# Patient Record
Sex: MALE | Race: WHITE
[De-identification: names, ages, dates, MRNs, and addresses within clinical notes are randomized per-mention and may not be internally consistent; named-entity substitution may affect disease eponyms.]

---

## 2019-07-03 ENCOUNTER — HOSPITAL ENCOUNTER (OUTPATIENT)
Dept: HOSPITAL 88 - ER | Age: 64
Setting detail: OBSERVATION
LOS: 2 days | Discharge: LEFT BEFORE DISCHARGE | End: 2019-07-05
Attending: INTERNAL MEDICINE | Admitting: INTERNAL MEDICINE
Payer: COMMERCIAL

## 2019-07-03 VITALS — WEIGHT: 179.56 LBS | BODY MASS INDEX: 24.32 KG/M2 | HEIGHT: 72 IN

## 2019-07-03 DIAGNOSIS — Z80.0: ICD-10-CM

## 2019-07-03 DIAGNOSIS — Z72.89: ICD-10-CM

## 2019-07-03 DIAGNOSIS — R79.89: ICD-10-CM

## 2019-07-03 DIAGNOSIS — D12.0: ICD-10-CM

## 2019-07-03 DIAGNOSIS — G89.4: ICD-10-CM

## 2019-07-03 DIAGNOSIS — J44.9: ICD-10-CM

## 2019-07-03 DIAGNOSIS — M19.90: ICD-10-CM

## 2019-07-03 DIAGNOSIS — I10: ICD-10-CM

## 2019-07-03 DIAGNOSIS — G89.18: Primary | ICD-10-CM

## 2019-07-03 DIAGNOSIS — Z86.010: ICD-10-CM

## 2019-07-03 LAB
ALBUMIN SERPL-MCNC: 4.4 G/DL (ref 3.5–5)
ALBUMIN/GLOB SERPL: 1.5 {RATIO} (ref 0.8–2)
ALP SERPL-CCNC: 112 IU/L (ref 40–150)
ALT SERPL-CCNC: 68 IU/L (ref 0–55)
ANION GAP SERPL CALC-SCNC: 17.4 MMOL/L (ref 8–16)
BACTERIA URNS QL MICRO: (no result) /HPF
BASOPHILS # BLD AUTO: 0 10*3/UL (ref 0–0.1)
BASOPHILS NFR BLD AUTO: 0.2 % (ref 0–1)
BILIRUB UR QL: (no result)
BUN SERPL-MCNC: 9 MG/DL (ref 7–26)
BUN/CREAT SERPL: 8 (ref 6–25)
CALCIUM SERPL-MCNC: 9.5 MG/DL (ref 8.4–10.2)
CHLORIDE SERPL-SCNC: 95 MMOL/L (ref 98–107)
CLARITY UR: CLEAR
CO2 SERPL-SCNC: 22 MMOL/L (ref 22–29)
COARSE GRAN CASTS URNS QL MICRO: (no result)
COLOR UR: YELLOW
DEPRECATED APTT PLAS QN: 27.5 SECONDS (ref 23.8–35.5)
DEPRECATED INR PLAS: 0.95
DEPRECATED NEUTROPHILS # BLD AUTO: 13.2 10*3/UL (ref 2.1–6.9)
DEPRECATED RBC URNS MANUAL-ACNC: (no result) /HPF (ref 0–5)
EGFRCR SERPLBLD CKD-EPI 2021: > 60 ML/MIN (ref 60–?)
EOSINOPHIL # BLD AUTO: 0 10*3/UL (ref 0–0.4)
EOSINOPHIL NFR BLD AUTO: 0.1 % (ref 0–6)
EPI CELLS URNS QL MICRO: (no result) /LPF
ERYTHROCYTE [DISTWIDTH] IN CORD BLOOD: 12.3 % (ref 11.7–14.4)
GLOBULIN PLAS-MCNC: 2.9 G/DL (ref 2.3–3.5)
GLUCOSE SERPLBLD-MCNC: 123 MG/DL (ref 74–118)
HCT VFR BLD AUTO: 40.6 % (ref 38.2–49.6)
HGB BLD-MCNC: 14.2 G/DL (ref 14–18)
KETONES UR QL STRIP.AUTO: (no result)
LEUKOCYTE ESTERASE UR QL STRIP.AUTO: NEGATIVE
LYMPHOCYTES # BLD: 0.6 10*3/UL (ref 1–3.2)
LYMPHOCYTES NFR BLD AUTO: 4.1 % (ref 18–39.1)
MCH RBC QN AUTO: 32.2 PG (ref 28–32)
MCHC RBC AUTO-ENTMCNC: 35 G/DL (ref 31–35)
MCV RBC AUTO: 92.1 FL (ref 81–99)
MONOCYTES # BLD AUTO: 1.3 10*3/UL (ref 0.2–0.8)
MONOCYTES NFR BLD AUTO: 8.7 % (ref 4.4–11.3)
MUCOUS THREADS URNS QL MICRO: (no result)
NEUTS SEG NFR BLD AUTO: 86.6 % (ref 38.7–80)
NITRITE UR QL STRIP.AUTO: NEGATIVE
PLATELET # BLD AUTO: 254 X10E3/UL (ref 140–360)
POTASSIUM SERPL-SCNC: 3.4 MMOL/L (ref 3.5–5.1)
PROT UR QL STRIP.AUTO: NEGATIVE
PROTHROMBIN TIME: 13.2 SECONDS (ref 11.9–14.5)
RBC # BLD AUTO: 4.41 X10E6/UL (ref 4.3–5.7)
SODIUM SERPL-SCNC: 131 MMOL/L (ref 136–145)
SP GR UR STRIP: >=1.03 (ref 1.01–1.02)
UROBILINOGEN UR STRIP-MCNC: 0.2 MG/DL (ref 0.2–1)
WBC #/AREA URNS HPF: (no result) /HPF (ref 0–5)

## 2019-07-03 PROCEDURE — 85730 THROMBOPLASTIN TIME PARTIAL: CPT

## 2019-07-03 PROCEDURE — 85610 PROTHROMBIN TIME: CPT

## 2019-07-03 PROCEDURE — 83690 ASSAY OF LIPASE: CPT

## 2019-07-03 PROCEDURE — 80053 COMPREHEN METABOLIC PANEL: CPT

## 2019-07-03 PROCEDURE — 36415 COLL VENOUS BLD VENIPUNCTURE: CPT

## 2019-07-03 PROCEDURE — 85025 COMPLETE CBC W/AUTO DIFF WBC: CPT

## 2019-07-03 PROCEDURE — 74177 CT ABD & PELVIS W/CONTRAST: CPT

## 2019-07-03 PROCEDURE — 74019 RADEX ABDOMEN 2 VIEWS: CPT

## 2019-07-03 PROCEDURE — 82150 ASSAY OF AMYLASE: CPT

## 2019-07-03 PROCEDURE — 99284 EMERGENCY DEPT VISIT MOD MDM: CPT

## 2019-07-03 PROCEDURE — 81001 URINALYSIS AUTO W/SCOPE: CPT

## 2019-07-03 NOTE — XMS REPORT
Clinical Summary

                             Created on: 2019



Renaldo Serna

External Reference #: [id number]

: 1955

Sex: Male



Demographics







                          Address                   [address]

[place]  [address]

 

                          Home Phone                [phone]

 

                          Preferred Language        Unknown

 

                          Marital Status            Single

 

                          [employer] Affiliation     [employer]

 

                          Race                      Unknown

 

                          Ethnic Group              Unknown





Author







                          Author                    [facility]

 

                          Organization              [facility]

 

                          Address                   Unknown

 

                          Phone                     Unavailable







Support







                Name            Relationship    Address         Phone

 

                    Lottie GARCIA                [address]

[place]  [address]                     [phone]







Care Team Providers







                    Care Team Member Name    Role                Phone

 

                    David Mascorro MD    PCP                 [phone]







Allergies

No Known Allergies



Medications







                          End Date                  Status



              Medication     Sig          Dispensed     Refills      Start  



                                         Date  

 

                                                    Active



                     multivitamin tablet     Take 1 tablet       0   



                                         by mouth     



                                         daily.     

 

                                                    Active



              gabapentin (NEURONTIN)     Take 1       90 capsule     1            10/10/201  



                     300 mg              capsule by          4  



                           capsuleIndications: Back     mouth 3 times     



                           pain                      daily.     

 

                                                    Active



              Nebulizer & Compressor     by           1 Device     0            10/10/201  



                     For Neb DeviIndications:     Misc.(Non-Pierre       4  



                           Reactive airway disease     g; Combo     



                                         Route) route.     

 

                                                    Active



              hypromellose (GONAK) 2.5     Instill 1     15 mL        3              



                     % ophthalmic        Drop in each        5  



                           solutionIndications: Dry     eye 3 times     



                           eye                       daily.     

 

                                                    Active



              traMADol (ULTRAM) 50 mg     Take 1 tablet     60 tablet     1              



                     tabletIndications: Back     by mouth            5  



                           pain                      every 12     



                                         hours as     



                                         needed for     



                                         Pain.     

 

                                                    Active



              Arm Brace (NEOPRENE WRIST     use as       1 Each       1              



                     SPLINT SUPPORT)     directed            5  



                           MiscIndications: Abnormal     NONF     



                                         MRI      

 

                                                    Active



              zafirlukast (ACCOLATE) 20     Take 1 tablet     180 tablet     5              



                     mg tabletIndications:     by mouth 2          5  



                           Asthmatic bronchitis,     times daily.     



                                         severe persistent,      



                                         uncomplicated,      



                                         Uncomplicated severe      



                                         persistent asthma      

 

                                                    Active



              celecoxib (CELEBREX) 200     Take 1       60 capsule     4              



                     mg capsuleIndications:     capsule by          6  



                           Arthralgia, unspecified     mouth 2 times     



                           joint                     daily.     

 

                                                    Active



              Nebulizer & Compressor     1 Device by     1 Device     0              



                     For Neb DeviIndications:     Misc.(Non-Pierre       6  



                           Asthmatic bronchitis,     g; Combo     



                           severe persistent, with     Route) route     



                           acute exacerbation        4 times     



                                         daily.     

 

                                                    Active



              blood pressure monitor     1 Device by     1 Kit        0              



                     (BLOOD PRESSURE KIT)     Misc.(Non-Pierre       6  



                           KitIndications: Essential     g; Combo     



                           hypertension              Route) route     



                                         daily.     

 

                                                    Active



              acetaminophen-codeine     Take 2       60 tablet     1              



                     (TYLENOL/CODEINE #3)     tablets by          6  



                           300-30 mg per             mouth every 4     



                           tabletIndications:        hours as     



                           Chronic left-sided low     needed for     



                           back pain with sciatica,     Pain.     



                                         sciatica laterality      



                                         unspecified      

 

                                                    Active



              ipratropium (ATROVENT)     Inhale 2.5 mL     250 mL       3              



                     0.02 % nebulizer     by mouth 4          7  



                           solutionIndications:      times daily.     



                                         Uncomplicated severe      



                                         persistent asthma,      



                                         Chronic obstructive      



                                         pulmonary disease with      



                                         acute exacerbation      

 

                                                    Active



              albuterol (PROVENTIL) 2.5     Inhale 3 mL     300 mL       3              



                     mg /3 mL (0.083 %)     by mouth            7  



                           nebulizer                 every 6 hours     



                           solutionIndications:      as needed for     



                           Chronic obstructive       Wheezing.     



                                         pulmonary disease with      



                                         acute exacerbation      

 

                                                    Active



              acetaminophen-codeine     Take 1 tablet     90 tablet     2              



                     (TYLENOL/CODEINE #3)     by mouth 2          7  



                           300-30 mg per             times daily.     



                                         tabletIndications: Acute      



                                         bilateral low back pain      



                                         with right-sided sciatica      

 

                                                    Active



              azelastine (OPTIVAR) 0.05     Instill 1     6 mL         3              



                     % ophthalmic        Drop in each        7  



                           solutionIndications: Dry     eye 2 times     



                           eye                       daily.     

 

                                                    Active



              albuterol (PROVENTIL) 2.5     Inhale 3 mL     300 mL       3              



                     mg /3 mL (0.083 %)     by mouth            7  



                           nebulizer                 every 4 hours     



                           solutionIndications:      as needed for     



                           Chronic obstructive       Wheezing or     



                           pulmonary disease with     Shortness of     



                           acute exacerbation        Breath.     

 

                                                    Active



              albuterol (PROVENTIL) 2.5     USE ONE VIAL     300 mL       3              



                     mg /3 mL (0.083 %)     VIA                 8  



                           nebulizer                 NEBULIZATION     



                           solutionIndications:      BY MOUTH     



                           Chronic obstructive       EVERY 6 HOURS     



                           pulmonary disease with     AS NEEDED FOR     



                           acute exacerbation        WHEEZING.     

 

                                                    Active



              albuterol (PROVENTIL) 2.5     USE ONE VIAL     300 mL       3            /201  



                     mg /3 mL (0.083 %)     (3ML) VIA           8  



                           nebulizer                 NEBULIZATION     



                           solutionIndications:      BY MOUTH     



                           Chronic obstructive       EVERY 6 HOURS     



                           pulmonary disease with     AS NEEDED FOR     



                           acute exacerbation        WHEEZING     

 

                                                    Active



              ipratropium (ATROVENT)     INHALE ONE     250 mL       3              



                     0.02 % nebulizer     VIAL VIA            8  



                           solutionIndications:      NEBULIZER BY     



                           Chronic obstructive       MOUTH FOUR     



                           pulmonary disease with     TIMES A DAY     



                                         acute exacerbation      

 

                                                    Active



              nystatin (MYCOSTATIN)     APPLY TO     60 g         2              



                     topical creamIndications:     AFFECTED            8  



                           Tinea                     AREA(S) THREE     



                                         TIMES A DAY     

 

                                                    Active



              hydrOXYzine (ATARAX) 25     TAKE ONE     180 tablet     2              



                     mg tabletIndications:     TABLET BY           8  



                           Rash                      MOUTH TWICE A     



                                         DAY OR THREE     



                                         TIMES A DAY.     

 

                                                    Active



              tiZANidine (ZANAFLEX) 4     TAKE ONE     180 tablet     2              



                     mg tabletIndications:     TABLET BY           8  



                           Spasm of muscle           MOUTH TWICE A     



                                         DAY AS NEEDED     



                                         FOR MUSCLE     



                                         SPASMS.     

 

                                                    Active



              budesonide-formoterol     Inhale 2     10.2 g       3              



                     (SYMBICORT HFA) 160-4.5     Puffs by            8  



                           mcg/actuation             mouth 2 times     



                           inhalerIndications:       daily.     



                                         Chronic obstructive      



                                         pulmonary disease with      



                                         acute exacerbation      

 

                                                    Active



                 fluticasone-vilanterol      60 Each         1                 



                           (BREO ELLIPTA) 200-25        8  



                                         mcg/dose DsDvIndications:      



                                         Chronic obstructive      



                                         pulmonary disease with      



                                         acute exacerbation      

 

                                                    Active



              mometasone (NASONEX) 50     SPRAY TWO     17 g         3              



                     mcg/actuation nasal     SPRAYS IN           8  



                           sprayIndications: Chronic     EACH NOSTRIL     



                           rhinitis                  ONCE DAILY.     

 

                                                    Active



              Omeprazole 40 mg     Take 1       90 capsule     2              



                     capsuleIndications:     capsule by          8  



                           Gastroesophageal reflux     mouth daily.     



                                         disease with esophagitis      

 

                                                    Active



              tamsulosin (FLOMAX) 0.4     Take 1       30 capsule     2              



                     mg extended release     capsule by          8  



                           capsuleIndications:       mouth daily.     



                                         Benign nodular prostatic      



                                         hyperplasia without lower      



                                         urinary tract symptoms      

 

                                                    Active



              calcipotriene (DOVONEX)     APPLY TO     60 g         2              



                     0.005 % topical     AFFECTED            8  



                           creamIndications: Rash     AREA(S) TWO     



                                         TIMES A DAY.     

 

                                                    Active



              hydroCHLOROthiazide 12.5     TAKE ONE     90 capsule     1              



                     mg capsuleIndications:     CAPSULE BY          8  



                           Essential hypertension     MOUTH EVERY     



                                         MORNING.     

 

                                                    Active



              alendronate (FOSAMAX) 70     TAKE ONE     12 tablet     1              



                     mg tabletIndications:     TABLET BY           8  



                           Senile osteoporosis       MOUTH ONCE     



                                         WEEKLY ON AN     



                                         EMPTY STOMACH     



                                         WITH 8 OUNCES     



                                         OF WATER AND     



                                         REMAIN     



                                         UPRIGHT FOR     



                                         30 MINUTES..     

 

                                                    Active



              sildenafil citrate     Take 1 tablet     30 tablet     1              



                     (VIAGRA) 25 mg      by mouth as         8  



                           tabletIndications: Other     needed for     



                           male erectile dysfunction     Erectile     



                                         Dysfunction.     

 

                          2019                Active



              albuterol 90     INHALE TWO     3 Month      3              



                 mcg/actuation     PUFFS BY        Supply          8  



                           inhalerIndications:       MOUTH EVERY 4     



                           Chronic obstructive       HOURS AS     



                           pulmonary disease with     NEEDED FOR     



                           acute exacerbation        WHEEZING OR     



                                         FOR SHORTNESS     



                                         OF BREATH.     

 

                                                    Active



              amLODIPine (NORVASC) 5 mg     Take 1 tablet     90 tablet     2              



                     tabletIndications:     by mouth            9  



                           Essential hypertension     daily.     

 

                                                    Active



              fluticasone-umeclidin-marlene     Inhale 1 Puff     1 Inhaler     5              



                     anter (TRELEGY ELLIPTA)     by mouth            9  



                           100-62.5-25 mcg           daily.     



                                         DsDvIndications: Chronic      



                                         obstructive pulmonary      



                                         disease with acute      



                                         exacerbation      

 

                                                    Active



              hydroCHLOROthiazide 12.5     TAKE ONE     30 capsule     4              



                     mg capsuleIndications:     CAPSULE BY          9  



                           Essential hypertension     MOUTH EVERY     



                                         MORNING     

 

                                                    Active



              tamsulosin (FLOMAX) 0.4     TAKE ONE     30 capsule     0              



                     mg extended release     CAPSULE BY          9  



                           capsuleIndications:       MOUTH DAILY     



                                         Benign nodular prostatic      



                                         hyperplasia without lower      



                                         urinary tract symptoms      

 

                                                    Active



              NASONEX 50 mcg/actuation     SPRAY TWO     16 g         2              



                     nasal sprayIndications:     SPRAYS IN           9  



                           Chronic rhinitis          EACH NOSTRIL     



                                         ONCE DAILY     

 

                          2018                Discontinued



              ketoconazole (NIZORAL) 2     Apply to     30 g         1              



                     % topical           affected area       5  



                           creamIndications: Tinea     2 times     



                           pedis                     daily.     

 

                          2018                Discontinued



              dexlansoprazole     Take 1       90 capsule     0            01/10/201  



                     (DEXILANT) 60 mg delayed     capsule by          6  



                           release                   mouth daily.     



                           capsuleIndications:       Auto sub for     



                           Gastroesophageal reflux     omeprazole     



                                         disease without      



                                         esophagitis      

 

                          2018                Discontinued



              fluticasone-salmeterol     Inhale 1 Puff     3 Month      3              



                 (ADVAIR DISKUS) 500-50     by mouth 2      Supply          6  



                           mcg/dose diskus           times daily.     



                                         inhalerIndications:      



                                         Chronic obstructive      



                                         pulmonary disease with      



                                         acute exacerbation      

 

                          2018                Discontinued



              hydrOXYzine (ATARAX) 25     Take 1 tablet     90 tablet     2              



                     mg tabletIndications:     po bid or           6  



                           Major depressive          tid.     



                                         disorder, single episode,      



                                         unspecified      

 

                          2018                Discontinued



              nystatin (MYCOSTATIN)     Apply to     60 g         3              



                     topical creamIndications:     affected area       7  



                           Tinea                     3 times     



                                         daily.     

 

                          2018                Discontinued



              hydrOXYzine (ATARAX) 25     TAKE ONE     270 tablet     1              



                     mg tabletIndications:     TABLET BY           7  



                           Chronic obstructive       MOUTH TWICE A     



                           pulmonary disease with     DAY TO THREE     



                           acute exacerbation        TIMES A DAY.     

 

                          2018                Discontinued



              sildenafil citrate     Take 1 tablet     10 tablet     3              



                     (VIAGRA) 100 mg     by mouth as         7  



                           tabletIndications:        needed for     



                           Erectile dysfunction due     Erectile     



                           to arterial insufficiency     Dysfunction.     

 

                          2018                Discontinued



              amLODIPine (NORVASC) 10     Take 1 tablet     90 tablet     3              



                     mg tabletIndications:     by mouth            7  



                           Essential hypertension     daily.     

 

                          2018                Discontinued



              amLODIPine (NORVASC) 10     Take 1 tablet     90 tablet     0              



                     mg tabletIndications:     by mouth            7  



                           Essential hypertension     daily.     

 

                          2018                Discontinued



              tiZANidine (ZANAFLEX) 4     TAKE ONE     180 tablet     0              



                     mg tabletIndications:     TABLET BY           7  



                           Spasm of muscle           MOUTH TWICE A     



                                         DAY AS NEEDED     



                                         FOR MUSCLE     



                                         SPASMS.     

 

                          2018                Discontinued



              Omeprazole 40 mg     Take 1       90 capsule     3              



                     capsuleIndications:     capsule by          7  



                           Gastroesophageal reflux     mouth daily.     



                                         disease with esophagitis      

 

                          2018                Discontinued



              sildenafil citrate     Take 1 tablet     20 tablet     5              



                     (VIAGRA) 100 mg     by mouth as         7  



                           tabletIndications:        needed for     



                           Erectile dysfunction due     Erectile     



                           to arterial insufficiency     Dysfunction.     

 

                          2018                Discontinued



              mometasone (NASONEX) 50     2 Sprays by     51 g         4              



                     mcg/actuation nasal     each nostril        7  



                           sprayIndications: Chronic     route daily.     



                                         seasonal allergic      



                                         rhinitis due to pollen      

 

                          2018                Discontinued



              fluticasone-vilanterol     Take 1 Puff     180 Each     2              



                     (BREO ELLIPTA) 200-25     by mouth            7  



                           mcg/dose DsDvIndications:     daily.     



                                         Chronic obstructive      



                                         pulmonary disease with      



                                         acute exacerbation      

 

                          2018                Discontinued



              albuterol (PROAIR HFA) 90     INHALE TWO     3 Month      3              



                 mcg/actuation     PUFFS BY        Supply          7  



                           inhalerIndications:       MOUTH EVERY 4     



                           Chronic obstructive       HOURS AS     



                           pulmonary disease with     NEEDED FOR     



                           acute exacerbation        WHEEZING OR     



                                         FOR SHORTNESS     



                                         OF BREATH.     

 

                          2018                Discontinued



              budesonide-formoterol     Inhale 2     10.2 g       3              



                     (SYMBICORT HFA) 160-4.5     Puffs by            7  



                           mcg/actuation             mouth 2 times     



                           inhalerIndications:       daily.     



                                         Moderate persistent      



                                         asthma with status      



                                         asthmaticus      

 

                          2018                Discontinued



              alendronate (FOSAMAX) 70     TAKE ONE     4 tablet     2              



                     mg tabletIndications:     TABLET BY           7  



                           Senile osteoporosis       MOUTH ONCE     



                                         WEEKLY ON AN     



                                         EMPTY STOMACH     



                                         WITH 8 OUNCES     



                                         OF WATER AND     



                                         REMAIN     



                                         UPRIGHT FOR     



                                         30 MINUTES.     

 

                          2018                Discontinued



              promethazine (PHENERGAN)     Take 1 tablet     60 tablet     3              



                     25 mg tabletIndications:     by mouth            7  



                           Nausea                    every 8 hours     



                                         as needed for     



                                         Nausea or     



                                         Vomiting.     

 

                          2018                Discontinued



              BREO ELLIPTA 200-25     INHALE ONE     60 Each      1              



                     mcg/dose DsDvIndications:     DOSE BY MOUTH       8  



                           Chronic obstructive       DAILY     



                                         pulmonary disease with      



                                         acute exacerbation      

 

                          2018                Discontinued



              calcipotriene (DOVONEX)     Apply to     60 g         4              



                     0.005 % topical     affected area       8  



                           creamIndications: Rash     2 times     



                                         daily.     

 

                          2018                Discontinued



              sildenafil citrate     Take 1 tablet     30 tablet     4              



                     (VIAGRA) 100 mg     by mouth as         8  



                           tabletIndications:        needed for     



                           Erectile dysfunction due     Erectile     



                           to arterial insufficiency     Dysfunction.     

 

                          2018                Discontinued



              omeprazole (PRILOSEC) 20     TAKE ONE     60 capsule     2              



                     mg delayed release     CAPSULE BY          8  



                           capsuleIndications:       MOUTH TWICE A     



                           Gastroesophageal reflux     DAY     



                                         disease with esophagitis      

 

                          11/10/2018                Discontinued



              ipratropium (ATROVENT)     Inhale 2.5 mL     300 mL       3              



                     0.02 % nebulizer     by mouth 4          8  



                           solutionIndications:      times daily.     



                                         Chronic obstructive      



                                         pulmonary disease with      



                                         acute exacerbation      

 

                          2018                Discontinued



              promethazine (PHENERGAN)     TAKE ONE     60 tablet     0              



                     25 mg tabletIndications:     TABLET BY           8  



                           Nausea                    MOUTH EVERY 8     



                                         HOURS AS     



                                         NEEDED FOR     



                                         NAUSEA AND     



                                         VOMITING     

 

                          2018                Discontinued



              tamsulosin (FLOMAX) 0.4     TAKE ONE     30 capsule     2              



                     mg extended release     CAPSULE BY          8  



                           capsuleIndications:       MOUTH DAILY     



                                         Benign nodular prostatic      



                                         hyperplasia without lower      



                                         urinary tract symptoms      

 

                          2018                Discontinued



              NASONEX 50 mcg/actuation     SPRAY TWO     16 g         3              



                     nasal sprayIndications:     SPRAYS IN           8  



                           Chronic rhinitis          EACH NOSTRIL     



                                         ONCE DAILY     

 

                          2018                Discontinued



              albuterol (PROVENTIL) 2.5     USE ONE VIAL     300 mL       3              



                     mg /3 mL (0.083 %)     VIA                 8  



                           nebulizer                 NEBULIZATION     



                           solutionIndications:      BY MOUTH     



                           Chronic obstructive       EVERY 6 HOURS     



                           pulmonary disease with     AS NEEDED FOR     



                           acute exacerbation        WHEEZING     

 

                          2018                Discontinued



              hydrOXYzine (ATARAX) 25     TAKE ONE     90 tablet     0            05/15/201  



                     mg tabletIndications:     TABLET BY           8  



                           Itch                      MOUTH TWICE A     



                                         DAY OR THREE     



                                         TIMES A DAY     

 

                          2018                Discontinued



              hydroCHLOROthiazide 12.5     TAKE ONE     30 capsule     0              



                     mg capsuleIndications:     CAPSULE BY          8  



                           Essential hypertension     MOUTH EVERY     



                                         MORNING     

 

                          2018                Discontinued



              tiZANidine (ZANAFLEX) 4     TAKE ONE     60 tablet     3              



                     mg tabletIndications:     TABLET BY           8  



                           Spasm of muscle           MOUTH TWICE A     



                                         DAY AS NEEDED     



                                         FOR MUSCLE     



                                         SPASMS.     

 

                          2018                Discontinued



              codeine-guaiFENesin     TAKE 5ML BY     120 mL       0              



                     (CHERATUSSIN AC)      MOUTH THREE         8  



                           mg/5 mL syrupIndications:     TIMES A DAY     



                           Cough                     AS NEEDED     



                                         COUGH.     

 

                          2018                Discontinued



              hydroCHLOROthiazide 12.5     TAKE ONE     30 capsule     0              



                     mg capsuleIndications:     CAPSULE BY          8  



                           Essential hypertension     MOUTH EVERY     



                                         MORNING     

 

                          2018                Discontinued



              hydroCHLOROthiazide 12.5     TAKE ONE     30 capsule     5              



                     mg capsuleIndications:     CAPSULE BY          8  



                           Essential hypertension     MOUTH EVERY     



                                         MORNING.     

 

                          2018                Discontinued



              tamsulosin (FLOMAX) 0.4     TAKE ONE     30 capsule     1              



                     mg extended release     CAPSULE BY          8  



                           capsuleIndications:       MOUTH DAILY     



                                         Benign nodular prostatic      



                                         hyperplasia without lower      



                                         urinary tract symptoms      

 

                          2019                Discontinued



                 HYDROcodone-ibuprofen     Take 1 tablet      0                 



                     7.5-200 mg per tablet     by mouth            8  



                                         every 4 hours     



                                         as needed.     

 

                          2018                Discontinued



              tamsulosin (FLOMAX) 0.4     Take 1       90 capsule     1              



                     mg extended release     capsule by          8  



                           capsuleIndications:       mouth daily.     



                                         Benign nodular prostatic      



                                         hyperplasia without lower      



                                         urinary tract symptoms      

 

                          2018                Discontinued



              hydroCHLOROthiazide 12.5     TAKE ONE     90 capsule     1              



                     mg capsuleIndications:     CAPSULE BY          8  



                           Essential hypertension     MOUTH EVERY     



                                         MORNING.     

 

                          2018                Discontinued



              tiZANidine (ZANAFLEX) 4     TAKE ONE     180 tablet     1              



                     mg tabletIndications:     TABLET BY           8  



                           Spasm of muscle           MOUTH TWICE A     



                                         DAY AS NEEDED     



                                         FOR MUSCLE     



                                         SPASMS.     

 

                          2018                Discontinued



              hydrOXYzine (ATARAX) 25     TAKE ONE     180 tablet     1              



                     mg tabletIndications:     TABLET BY           8  



                           Itch                      MOUTH TWICE A     



                                         DAY OR THREE     



                                         TIMES A DAY.     

 

                          2018                Discontinued



              mometasone (NASONEX) 50     SPRAY TWO     16 g         3              



                     mcg/actuation nasal     SPRAYS IN           8  



                           sprayIndications: Chronic     EACH NOSTRIL     



                           rhinitis                  ONCE DAILY.     

 

                          2018                Discontinued



              omeprazole (PRILOSEC) 20     Take 1       60 capsule     2              



                     mg delayed release     capsule by          8  



                           capsuleIndications:       mouth 2 times     



                           Gastroesophageal reflux     daily.     



                                         disease with esophagitis      

 

                          2018                Discontinued



              calcipotriene (DOVONEX)     Apply to     180 g        1              



                     0.005 % topical     affected area       8  



                           creamIndications: Rash     2 times     



                                         daily.     

 

                          2018                Discontinued



                 fluticasone-vilanterol      60 Each         1                 



                           (BREO ELLIPTA) 200-25        8  



                                         mcg/dose DsDvIndications:      



                                         Chronic obstructive      



                                         pulmonary disease with      



                                         acute exacerbation      

 

                          2018                Discontinued



              alendronate (FOSAMAX) 70     TAKE ONE     12 tablet     1              



                     mg tabletIndications:     TABLET BY           8  



                           Senile osteoporosis       MOUTH ONCE     



                                         WEEKLY ON AN     



                                         EMPTY STOMACH     



                                         WITH 8 OUNCES     



                                         OF WATER AND     



                                         REMAIN     



                                         UPRIGHT FOR     



                                         30 MINUTES..     

 

                          2018                Discontinued



              budesonide-formoterol     Inhale 2     10.2 g       3              



                     (SYMBICORT HFA) 160-4.5     Puffs by            8  



                           mcg/actuation             mouth 2 times     



                           inhalerIndications:       daily.     



                                         Moderate persistent      



                                         asthma with status      



                                         asthmaticus      

 

                          2018                Discontinued



              amLODIPine (NORVASC) 10     Take 1 tablet     90 tablet     1              



                     mg tabletIndications:     by mouth            8  



                           Essential hypertension     daily.     

 

                          2018                Discontinued



              nystatin (MYCOSTATIN)     Apply to     60 g         3              



                     topical creamIndications:     affected area       8  



                           Tinea                     3 times     



                                         daily.     

 

                          2018                Discontinued



              dexlansoprazole     Take 1       90 capsule     1              



                     (DEXILANT) 60 mg delayed     capsule by          8  



                           release                   mouth daily.     



                           capsuleIndications:       Auto sub for     



                           Gastroesophageal reflux     omeprazole     



                                         disease without      



                                         esophagitis      

 

                          2018                Discontinued



              Omeprazole 40 mg     TAKE ONE     30 capsule     2              



                     capsuleIndications:     CAPSULE BY          8  



                           Gastroesophageal reflux     MOUTH DAILY     



                                         disease with esophagitis      

 

                          2018                Discontinued



              albuterol (PROVENTIL) 2.5     USE ONE VIAL     300 mL       2              



                     mg /3 mL (0.083 %)     VIA                 8  



                           nebulizer                 NEBULIZATION     



                           solutionIndications:      BY MOUTH     



                           Chronic obstructive       EVERY 6 HOURS     



                           pulmonary disease with     AS NEEDED FOR     



                           acute exacerbation        WHEEZING     

 

                          2018                Discontinued



              hydrOXYzine (ATARAX) 25     TAKE ONE     180 tablet     2              



                     mg tabletIndications:     TABLET BY           8  



                           Itch, Psychophysiological     MOUTH TWICE A     



                           insomnia                  DAY OR THREE     



                                         TIMES A DAY.     

 

                          2018                Discontinued



              tiZANidine (ZANAFLEX) 4     TAKE ONE     180 tablet     2              



                     mg tabletIndications:     TABLET BY           8  



                           Spasm of muscle           MOUTH TWICE A     



                                         DAY AS NEEDED     



                                         FOR MUSCLE     



                                         SPASMS.     

 

                          2018                Discontinued



              NASONEX 50 mcg/actuation     SPRAY TWO     16 g         2              



                     nasal sprayIndications:     SPRAYS IN           8  



                           Chronic rhinitis          EACH NOSTRIL     



                                         ONCE DAILY     

 

                          10/30/2018                Discontinued



              tamsulosin (FLOMAX) 0.4     TAKE ONE     30 capsule     0              



                     mg extended release     CAPSULE BY          8  



                           capsuleIndications:       MOUTH DAILY     



                                         Benign nodular prostatic      



                                         hyperplasia without lower      



                                         urinary tract symptoms      

 

                          2018                Discontinued



              tamsulosin (FLOMAX) 0.4     TAKE ONE     30 capsule     0            10/30/201  



                     mg extended release     CAPSULE BY          8  



                           capsuleIndications:       MOUTH DAILY     



                                         Benign nodular prostatic      



                                         hyperplasia without lower      



                                         urinary tract symptoms      

 

                          2018                Discontinued



              Omeprazole 40 mg     TAKE ONE     30 capsule     1              



                     capsuleIndications:     CAPSULE BY          8  



                           Gastroesophageal reflux     MOUTH DAILY     



                                         disease with esophagitis      

 

                          2018                Discontinued



              Omeprazole 40 mg     Take 1       90 capsule     2              



                     capsuleIndications:     capsule by          8  



                           Gastroesophageal reflux     mouth daily.     



                                         disease with esophagitis      

 

                          2018                Discontinued



              calcipotriene (DOVONEX)     APPLY TO     60 g         2              



                     0.005 % topical     AFFECTED            8  



                           creamIndications: Rash     AREA(S) TWO     



                                         TIMES A DAY     

 

                          2018                Discontinued



              Omeprazole 40 mg     Take 1       90 capsule     2              



                     capsuleIndications:     capsule by          8  



                           Gastroesophageal reflux     mouth daily.     



                                         disease with esophagitis      

 

                          2018                Discontinued



              tamsulosin (FLOMAX) 0.4     TAKE ONE     30 capsule     0              



                     mg extended release     CAPSULE BY          8  



                           capsuleIndications:       MOUTH DAILY     



                                         Benign nodular prostatic      



                                         hyperplasia without lower      



                                         urinary tract symptoms      

 

                          2018                Discontinued



              NASONEX 50 mcg/actuation     SPRAY TWO     17 g         3              



                     nasal sprayIndications:     SPRAYS IN           8  



                           Chronic rhinitis          EACH NOSTRIL     



                                         ONCE DAILY     

 

                          2019                Discontinued



              amLODIPine (NORVASC) 10     Take 1 tablet     90 tablet     1              



                     mg tabletIndications:     by mouth            8  



                           Essential hypertension     daily.     

 

                          2019                Discontinued



              tamsulosin (FLOMAX) 0.4     TAKE ONE     30 capsule     0              



                     mg extended release     CAPSULE BY          8  



                           capsuleIndications:       MOUTH DAILY     



                                         Benign nodular prostatic      



                                         hyperplasia without lower      



                                         urinary tract symptoms      

 

                          2019                Discontinued



              tamsulosin (FLOMAX) 0.4     TAKE ONE     30 capsule     0            /201  



                     mg extended release     CAPSULE BY          9  



                           capsuleIndications:       MOUTH DAILY     



                                         Benign nodular prostatic      



                                         hyperplasia without lower      



                                         urinary tract symptoms      

 

                          2019                Discontinued



              tamsulosin (FLOMAX) 0.4     TAKE ONE     30 capsule     0            /201  



                     mg extended release     CAPSULE BY          9  



                           capsuleIndications:       MOUTH DAILY     



                                         Benign nodular prostatic      



                                         hyperplasia without lower      



                                         urinary tract symptoms      







Active Problems







 



                           Problem                   Noted Date

 

 



                           Varicose veins            2016

 

 



                           Carpal tunnel syndrome, bilateral     2016

 

 



                           Elevated PSA              10/23/2015

 

 



                           Benign prostatic hyperplasia     2015

 

 



                           NS (nuclear sclerosis)     2015

 

 



                           Hyperopia with astigmatism and presbyopia     2015

 

 



                           History of shingles       2015

 

 



                           Dry eye                   2015

 

 



                           Low back pain             2014

 

 



                           Edentulous                2014







Encounters







                          Care Team                 Description



                     Date                Type                Specialty  

 

                                        



David Mascorro MD               Chronic rhinitis



                     2019          Refill              Family Practice  

 

                                        



Sasha Davis LMSW                 Pre-clinic Chart Review;  (DME repairs)



                     2019          Telephone           Social Work  

 

                                        



Jeff Thomas RN           



                           2019                Nurse Triage   

 

                                        



David Mascorro MD               Benign nodular prostatic hyperplasia without lower urinary

 tract symptoms



                     2019          Refill              Family Practice  

 

                                        



David Mascorro MD               Benign nodular prostatic hyperplasia without lower urinary

 tract symptoms



                     2019          Refill              Family Practice  

 

                                        



David Mascorro MD               Essential hypertension



                     2019          Refill              [facility]  

 

                                        



David Mascorro MD               Benign nodular prostatic hyperplasia without lower urinary

 tract symptoms



                     2019          Refill              Family Practice  

 

                                        



David Mascorro MD               Elevated liver enzymes (Primary Dx); 

Essential hypertension; 

Chronic obstructive pulmonary disease with acute exacerbation



                     2019          Office Visit        [facility]  

 

                                                     



                           2019                Travel   

 

                                        



David Mascorro MD               Benign nodular prostatic hyperplasia without lower urinary

 tract symptoms



                     2018          Refill              Family Practice  

 

                                        



[employer]                 Durable Medical Equipment (Inquire about title XIX received

 requesting power wheelchair repair.); Pre-clinic Chart Review



                     2018          Telephone           Social Work  

 

                                        



Wilfredo Rivero MD                      Essential hypertension (Primary Dx); 

Chronic obstructive pulmonary disease with acute exacerbation; 

Chronic rhinitis; 

Rash; 

Benign nodular prostatic hyperplasia without lower urinary tract symptoms; 

Gastroesophageal reflux disease with esophagitis; 

Spasm of muscle; 

Senile osteoporosis; 

Other male erectile dysfunction; 

Need for influenza vaccination; 

Screening for condition



                     2018          Office Visit        [facility]  

 

                                                     



                           2018                Travel   

 

                                        



David Mascorro MD               Chronic rhinitis



                     2018          Refill              Family Practice  

 

                                        



David Mascorro MD               Benign nodular prostatic hyperplasia without lower urinary

 tract symptoms



                     2018          Refill              Family Practice  

 

                                        



Bree Friedman RN             Gastroesophageal reflux disease with esophagitis



                     2018          Refill              Family Practice  

 

                                        



David Mascorro MD               Tinea; 

Rash



                     2018          Refill              Family Practice  

 

                                        



David Mascorro MD               Gastroesophageal reflux disease with esophagitis



                     2018          Orders Only         Family Practice  

 

                                        



David Mascorro MD               Gastroesophageal reflux disease with esophagitis



                     2018          Refill              Family Practice  

 

                                        



David Mascorro MD               Chronic obstructive pulmonary disease with acute exacerbation





                     11/10/2018          Refill              Pulmonology  

 

                                        



David Mascorro MD               Chronic obstructive pulmonary disease with acute exacerbation





                     2018          Refill              Pulmonology  

 

                                        



David Mascorro MD               Benign nodular prostatic hyperplasia without lower urinary

 tract symptoms



                     10/30/2018          Refill              Family Practice  

 

                                        



David Mascorro MD               Benign nodular prostatic hyperplasia without lower urinary

 tract symptoms



                     2018          Refill              Family Practice  

 

                                        



David Mascorro MD               Chronic rhinitis



                     2018          Refill              Family Practice  

 

                                        



David Mascorro MD               Hepatomegaly (Primary Dx); 

Splenomegaly; 

Itch; 

Spasm of muscle; 

Psychophysiological insomnia



                     2018          Office Visit        Family Practice  

 

                                        



David Mascorro MD               Chronic obstructive pulmonary disease with acute exacerbation





                     2018          Refill              Pulmonology  

 

                                        



David aMscorro MD               Gastroesophageal reflux disease with esophagitis (Primary

 Dx)



                     2018          Refill              Family Practice  

 

                                        



Martita Hammonds NP                 Need for Tdap vaccination (Primary Dx); 

Benign nodular prostatic hyperplasia without lower urinary tract symptoms; 

Essential hypertension; 

Spasm of muscle; 

Itch; 

Chronic obstructive pulmonary disease with acute exacerbation; 

Chronic rhinitis; 

Gastroesophageal reflux disease with esophagitis; 

Rash; 

Senile osteoporosis; 

Moderate persistent asthma with status asthmaticus; 

Tinea; 

Gastroesophageal reflux disease without esophagitis; 

Lower abdominal pain; 

Screen for STD (sexually transmitted disease); 

Colon cancer screening; 

Preventative health care



                     2018          Office Visit        Family Practice  

 

                                        



Magdalena Singh, FROYLAN                    Information Only



                     2018          Telephone           Psychiatry  

 

                                        



David Mascorro MD               Benign nodular prostatic hyperplasia without lower urinary

 tract symptoms



                     2018          Refill              Family Practice  

 

                                        



Bree Friedman, FROYLAN             Essential hypertension



                     2018          Refill              Family Practice  



after 2018



Immunizations







  



                     Name                Administration Dates     Next Due

 

  



                           GENTAMICIN 40 MG/ML       2016 



                                         INJECTION  

 

  



                           Herpes Zoster Vaccine In     2015 



                                         Clinic  

 

  



                           Influenza <Unspecified>     10/01/2017 

 

  



                           Influenza Vaccine         10/10/2014 

 

  



                           Influenza Vaccine,        2018 (Deferred: Patient already had this 



                           Seasonal, Injectable      immunization - pt states he received influenza 



                                         vaccine in 2017 at [facility]) 

 

  



                           Influenza,                2018 (Deferred: Patient Refused) 



                                         Vaccine<FLUCELVAX>(Multi-  



                                         Dose)  

 

  



                           KETOROLAC 60 MG/2 ML IM     2016 

 

  



                           PPV 23 Pneumococcal       2014 



                                         Polysaccaride  

 

  



                           Triamcinolone 40mg/ml Inj     2016 

 

  



                           Twinrix-HEP A&b           2016 







Family History







   



                 Medical History     Relation        Name            Comments

 

   



                           Stroke                    Father  

 

   



                           Cancer                    Mother  

 

   



                           Cancer                    Sister  









   



                 Relation        Name            Status          Comments

 

   



                           Father                     

 

   



                           Mother                     

 

   



                                         Sister   







Social History







                                        Date



                 Tobacco Use     Types           Packs/Day       Years Used 

 

                                         



                                         Never Smoker    

 

    



                                         Smokeless Tobacco: Never   



                                         Used   









                                        Tobacco Cessation: Counseling Given: No











                    Drinks/Week         oz/Week             Comments



                                         Alcohol Use   

 

                                        



0 Standard drinks or equivalent    0.0                        



                                         Yes   









  



                     Food Insecurity     Answer              Date Recorded

 

  



                     Within the past 12 months, you worried that your     Never true          2019



                                         food would run out before you got money to buy  



                                         more.  

 

  



                     Within the past 12 months, the food you bought     Never true          2019



                                         just didn't last and you didn't have money to get  



                                         more.  









 



                           Sex Assigned at Birth     Date Recorded

 

 



                                         Not on file 









                                        Industry



                           Job Start Date            Occupation 

 

                                        Not on file



                           Not on file               Not on file 









                                        Travel End



                           Travel History            Travel Start 

 





                                         No recent travel history available.







Last Filed Vital Signs







                    Reading             Time Taken          Comments



                                         Vital Sign   

 

                    115/71              2019  9:02 AM CST     



                                         Blood Pressure   

 

                    88                  2019  9:02 AM CST     



                                         Pulse   

 

                    36.9 C (98.4 F)    2019  9:02 AM CST     



                                         Temperature   

 

                    18                  2019  9:02 AM CST     



                                         Respiratory Rate   

 

                    -                   -                    



                                         Oxygen Saturation   

 

                    -                   -                    



                                         Inhaled Oxygen   



                                         Concentration   

 

                    82.5 kg (181 lb 12.8 oz)    2019  9:02 AM CST     



                                         Weight   

 

                    182.9 cm (6')       2019  9:02 AM CST     



                                         Height   

 

                    24.66               2019  9:02 AM CST     



                                         Body Mass Index   







Plan of Treatment







   



                 Health Maintenance     Due Date        Last Done       Comments

 

   



                     Colorectal Cancer Scrn     2019, 05/10/2017 



                                         Annual (FIT/FOBT) Age 50   



                                         to 75   







Goals







     



            Goal       Patient     Associated     Recent Progress     Patient-Stat     Author



                     Goal Type           Problems            ed? 

 

     



                 Reduce pain     Lifestyle       Yes             Brittany Olmos







Procedures







                                        Comments



                 Procedure Name     Priority        Date/Time       Associated Diagnosis 

 

                                        



Results for this procedure are in the results section.



                 GAMMA GLUTAMYL     Routine         2019      Elevated liver enzymes 



                           TRANSFERASE (GGT)         10:24 AM CST  

 

                                        



Results for this procedure are in the results section.



                 HEMOGLOBIN A1C     Routine         2019      Elevated liver enzymes 



                                         10:24 AM CST  

 

                                        



Results for this procedure are in the results section.



                 GLUCOSE, FASTING     Routine         2019      Elevated liver enzymes 



                                         10:24 AM CST  

 

                                        



Results for this procedure are in the results section.



                 ELECTROLYTES     Routine         2019      Elevated liver enzymes 



                                         10:24 AM CST  

 

                                        



Results for this procedure are in the results section.



                 UREA NITROGEN/CREATININE     Routine         2019      Elevated liver enzymes 



                                         10:24 AM CST  

 

                                        



Results for this procedure are in the results section.



                 URINALYSIS      Routine         2019      Elevated liver enzymes 



                                         10:24 AM CST  

 

                                        



Results for this procedure are in the results section.



                 LIVER PROFILE     Routine         2019      Elevated liver enzymes 



                                         10:24 AM CST  

 

                                        



Results for this procedure are in the results section.



                 LIPID PROFILE     Routine         2019      Elevated liver enzymes 



                                         10:24 AM CST  

 

                                        



Results for this procedure are in the results section.



                 CBC (WITHOUT     Routine         2019      Elevated liver enzymes 



                           DIFFERENTIAL)             10:24 AM CST  

 

                                        



Results for this procedure are in the results section.



                 HEPATITIS PANEL     Routine         2018      Screening for condition 



                                         11:23 AM CST  

 

                                        



Results for this procedure are in the results section.



                 HIV-1/HIV-2 ROUTINE     Routine         2018      Screening for condition 



                           SCREENING                 11:23 AM CST  

 

                                        



Results for this procedure are in the results section.



                 VIT D, 25-HYDROXY     Routine         2018      Spasm of muscle 



                                         11:23 AM CST  

 

                                        



Results for this procedure are in the results section.



                 CBC (WITHOUT     Routine         2018      Screening for condition 



                           DIFFERENTIAL)             11:23 AM CST  

 

                                        



Results for this procedure are in the results section.



                 FREE T4         Routine         2018      Screening for condition 



                                         11:23 AM CST  

 

                                        



Results for this procedure are in the results section.



                 BASIC METABOLIC PANEL     Routine         2018      Essential hypertension 



                                         11:23 AM CST  

 

                                        



Results for this procedure are in the results section.



                 LIVER PROFILE     Routine         2018      Essential hypertension 



                                         11:23 AM CST  

 

                                        



Results for this procedure are in the results section.



                 LIPID PROFILE     Routine         2018      Screening for condition 



                                         11:23 AM CST  

 

                                        



Results for this procedure are in the results section.



                 HEMOGLOBIN A1C     Routine         2018      Screening for condition 



                                         11:23 AM CST  

 

                                        



Results for this procedure are in the results section.



                 THYROID STIMULATING     Routine         2018      Screening for condition 



                           HORMONE (TSH)             11:23 AM CST  

 

                                        



Results for this procedure are in the results section.



                 FECAL OCCULT BLOOD     Routine         2018      Colon cancer screening 



                                         3:50 PM CDT  

 

                                        



Results for this procedure are in the results section.



                 VIT D, 25-HYDROXY     Routine         2018      Preventative health care 



                                         12:06 PM CDT  

 

                                        



Results for this procedure are in the results section.



                 HEMOGLOBIN A1C     Routine         2018      Preventative health care 



                                         12:06 PM CDT  

 

                                        



Results for this procedure are in the results section.



                 FREE T4         Routine         2018      Preventative health care 



                                         12:06 PM CDT  

 

                                        



Results for this procedure are in the results section.



                 THYROID STIMULATING     Routine         2018      Preventative health care 



                           HORMONE (TSH)             12:06 PM CDT  

 

                                        



Results for this procedure are in the results section.



                 LIPID PROFILE     Routine         2018      Preventative health care 



                                         12:06 PM CDT  

 

                                        



Results for this procedure are in the results section.



                 CBC/DIFF        Routine         2018      Preventative health care 



                                         12:06 PM CDT  

 

                                        



Results for this procedure are in the results section.



                 LIVER PROFILE     Routine         2018      Preventative health care 



                                         12:06 PM CDT  

 

                                        



Results for this procedure are in the results section.



                 BASIC METABOLIC PANEL     Routine         2018      Preventative health care 



                                         12:06 PM CDT  

 

                                        



Results for this procedure are in the results section.



                 HSV 1 AND 2-SPECIFIC AB,     Routine         2018      Screen for STD (sexually 



                     IGG W/ REFLEX       11:39 AM CDT        transmitted disease) 

 

                                        



Results for this procedure are in the results section.



                 CHLAM/GC DNA AMPLI     Routine         2018      Screen for STD (sexually 



                           11:39 AM CDT              transmitted disease) 

 

                                        



Results for this procedure are in the results section.



                 SYPHILIS SCREEN FOR     Routine         2018      Screen for STD (sexually 



                     INFECTION           11:38 AM CDT        transmitted disease) 

 

                                        



Results for this procedure are in the results section.



                 HEPATITIS PANEL     Routine         2018      Screen for STD (sexually 



                           11:38 AM CDT              transmitted disease) 

 

                                        



Results for this procedure are in the results section.



                 HIV-1/HIV-2 ROUTINE     Routine         2018      Screen for STD (sexually 



                     SCREENING           11:38 AM CDT        transmitted disease) 

 

                                         



                 HEMOCCULT KIT FOR     Routine         2018      Colon cancer screening 



                           SPECIMEN COLLECTION AT      11:35 AM CDT  



                                         HOME    



after 2018



Results

* HEMOGLOBIN A1C (2019 10:24 AM CST)



Only the most recent of 3 results within the time period is included.





    



              Component     Value        Ref Range     Performed At     Pathologist



                                         Signature

 

    



                 Hemoglobin A1c     5.6             4.3 - 6.1 %     [facility] 

 

    



                 Est Average     114.0           mg/dL           [facility] 



                           Gluc                      [facility] 













                                         Specimen

 





                                         Blood









   



                 Performing Organization     Address         City/State/Zipcode     Phone Number

 

   



                                         [facility]   

 

   



                                         [facility]   





* [facility] CHEMISTRIES (2019 10:24 AM CST)





    



              Component     Value        Ref Range     Performed At     Pathologist



                                         Signature

 

    



                     Color               Straw               [facility] 



                                         3 

 

    



                     Clarity             Clear               [facility] 



                                         3 

 

    



                 Specific        1.004           1.001 - 1.035     [facility] 



                           Gravity                   3 

 

    



                 pH              6.0             5 - 8           [facility] 



                                         3 

 

    



                 Protein         Negative        NEG             [facility] 



                                         3 

 

    



                 Glucose         Negative        NEG             [facility] 



                                         3 

 

    



                 Ketones         Negative        NEG             [facility] 



                                         3 

 

    



                 Bilirubin       Negative        NEG             [facility] 



                                         3 

 

    



                 Nitrate         Negative        NEG             [facility] 



                                         3 

 

    



                 Urobilinogen,Se     <1.0            0.2 - 1.0 EU/dL     [facility] 



                           mi-Qn                     3 

 

    



                 Leukocyte       Negative        NEG             [facility] 



                                         3 

 

    



                 Occult Blood     Negative        NEG             [facility] 



                                         3 













                                         Specimen

 





                                         Urine









   



                 Performing Organization     Address         [place] [place]

 

   



                                         [facility]   

 

   



                                         [facility]   





* ELECTROLYTES (2019 10:24 AM CST)





    



              Component     Value        Ref Range     Performed At     Pathologist



                                         Signature

 

    



                 Sodium          132 (L)         136 - 145 mmol/L     [facility] 



                                         1 

 

    



                 Potassium       4.1             3.5 - 5.1 mmol/L     [facility] 



                                         1 

 

    



                 Chloride        95 (L)          98 - 107 mmol/L     [facility] 



                                         1 

 

    



                 CO2             25              21 - 31 mmol/L     [facility] 



                                         1 

 

    



                     Anion Gap           12                  [facility] 



                                         1 













                                         Specimen

 





                                         Blood









   



                 Performing Organization     Address         [place] [place]

 

   



                                         [facility]   

 

   



                                         [facility]   





* LIVER PROFILE (2019 10:24 AM CST)



Only the most recent of 3 results within the time period is included.





    



              Component     Value        Ref Range     Performed At     Pathologist



                                         Signature

 

    



                 Protein, Total,     7.1             6.0 - 8.3 g/dL     [facility] 



                           Serum                     1 

 

    



                 Albumin         4.5             4.2 - 5.5 g/dL     [facility] 



                                         1 

 

    



                 Bilirubin,      1.2             0.2 - 1.2 mg/dL     [facility] 



                           Total                     1 

 

    



                 Alkaline        118 (H)         34 - 104 U/L     [facility] 



                           Phosphatase, S            1 

 

    



                 AST (SGOT)      78 (H)          13 - 39 U/L     [facility] 



                                         1 

 

    



                 ALT             67 (H)          7 - 52 U/L      [facility] 



                                         1 

 

    



                 D Bilirubin     0.3 (H)         0.0 - 0.2 mg/dL     [facility] 



                                         1 













                                         Specimen

 





                                         Blood









   



                 Performing Organization     Address         [place] [place]

 

   



                                         [facility]   

 

   



                                         [facility]   





* LIPID PROFILE (2019 10:24 AM CST)



Only the most recent of 3 results within the time period is included.





    



              Component     Value        Ref Range     Performed At     Pathologist



                                         Signature

 

    



                 Cholesterol     162             mg/dL           [facility] 



                           Comment:                  1 



                                         REFERENCE RANGE:   



                                         Desirable: <200 mg/dL   



                                         Borderline: 200-240 mg/dL   



                                         High Risk: >240 mg/dL   

 

    



                 Triglyceride     56              <150 mg/dL      [facility] 



                           Comment:                  1 



                                         REFERENCE RANGE:   



                                         Normal: <150 mg/dL   



                                         Borderline High: 150-199 mg/dL   



                                         High: 200-499 mg/dL   



                                         Very High: >yo=841 mg/dL   

 

    



                 HDL             65              mg/dL           [facility] 



                           Comment:                  1 



                                         Increased CHD risk: <40 mg/dL   



                                         Decreased CHD risk: >60 mg/dL   

 

    



                 LDL             86              mg/dL           [facility] 



                           Comment:                  1 



                                         REFERENCE RANGE:   



                                         Optimal: <100 mg/dL   



                                         Near Optimal: 100-129 mg/dL   



                                         Borderline High: 130-159 mg/dL   



                                         High: 160-189 mg/dL   



                                         Very High: >wr=886 mg/dL   













                                         Specimen

 





                                         Blood









   



                 Performing Organization     Address         [facility]     Phone Number

 

   



                                         [facility]   

 

   



                                         [facility]   





* GGT (2019 10:24 AM CST)





    



              Component     Value        Ref Range     Performed At     Pathologist



                                         Signature

 

    



                 GGT             225 (H)         9 - 64 U/L      [facility] 



                                         1 













                                         Specimen

 





                                         Blood









   



                 Performing Organization     Address         [place] [place]     [place]

 

   



                                         [facility]   

 

   



                                         [facility]   





* GLUCOSE, FASTING (2019 10:24 AM CST)





    



              Component     Value        Ref Range     Performed At     Pathologist



                                         Signature

 

    



                 Glucose,        95              74 - 106 mg/dL     [facility] 



                           Fasting                   1 













                                         Specimen

 





                                         Blood









   



                 Performing Organization     Address         [place] [place]     [place]

 

   



                                         [facility]   

 

   



                                         [facility]   





* CBC (2019 10:24 AM CST)



Only the most recent of 2 results within the time period is included.





    



              Component     Value        Ref Range     Performed At     Pathologist



                                         Signature

 

    



                 WBC             5.8             4.5 - 12.0 K/uL     [facility] 



                                         2 

 

    



                 RBC             4.45 (L)        4.60 - 6.20 M/uL     [facility] 



                                         2 

 

    



                 Hemoglobin      14.0            14.0 - 18.0 g/dL     [facility] 



                                         2 

 

    



                 Hematocrit      41.2            40.0 - 54.0 %     [facility] 



                                         2 

 

    



                 MCV             93 (H)          82 - 92 fL      [facility] 



                                         2 

 

    



                 MCH             31.5 (H)        27.0 - 31.0 pg     [facility] 



                                         2 

 

    



                 MCHC            34.0            32.0 - 36.0 g/dL     [facility] 



                                         2 

 

    



                 RDW             41.3            35.1 - 43.9 fL     [facility] 



                                         2 

 

    



                 Platelets       238             150 - 400 K/uL     [facility] 



                                         2 

 

    



                 Mean Platelet     11.1            9.4 - 12.4 fL     [facility] 



                           Volume                    2 

 

    



                     Percent NRBC        0.0                 [facility] 



                                         2 

 

    



                     Absolute NRBC       0.00                [facility] 



                                         2 













                                         Specimen

 





                                         Blood









   



                 Performing Organization     Address         [place] [place]     [place]

 

   



                                         [facility]   

 

   



                                         [facility]   





* UREA NITROGEN/CREA (2019 10:24 AM CST)





    



              Component     Value        Ref Range     Performed At     Pathologist



                                         Signature

 

    



                 BUN             7               7 - 25 mg/dL     [facility] 



                                         1 

 

    



                 Creatinine      0.70            0.7 - 1.3 mg/dL     [facility] 



                                         1 

 

    



                 GFR, Estimated     >60             mL/min/1.73 m2     [facility] 



                                         1 

 

    



                 eGFR If Africn     >60             mL/min/1.73 m2     [facility] 



                           Am                        1 













                                         Specimen

 





                                         Other (Specify in



                                         Comments)









   



                 Performing Organization     Address         [place] [place]     [place]

 

   



                                         [facility]   

 

   



                                         [facility]   





* VIT D, 25-HYDROXY (2018 11:23 AM CST)



Only the most recent of 2 results within the time period is included.





    



              Component     Value        Ref Range     Performed At     Pathologist



                                         Signature

 

    



                 Vit D,          42.9            30 - 100 ng/mL     [facility] 



                     25-Hydroxy          Comment:            IMMUNOLOGY 



                                         Vitamin D deficiency has been   



                                         defined by the [place] of   



                                         Medicine and   



                                         Endocrine Society guideline   



                                         as a level of serum 25-OH   



                                         Vitamin D less than 20   



                                         ng/mL. The Endocrine Society   



                                         further defines Vitamin D   



                                         insufficiency as a   



                                         level between 21 and 29 ng/mL   



                                         and sufficiency as a level   



                                         between 30 and 100   



                                         ng/mL.   













                                         Specimen

 











   



                 Performing Organization     Address         [place] [place]     Phone [place]

 

   



                                         [facility]   

 

   



                                         [facility]   





* HIV-1/HIV-2 ROUTINE SCREENING (2018 11:23 AM CST)



Only the most recent of 2 results within the time period is included.





    



              Component     Value        Ref Range     Performed At     Pathologist



                                         Signature

 

    



                 HIV-1/HIV-2     Negative        NEG             [facility] 



                                         3 













                                         Specimen

 











   



                 Performing Organization     Address         [place] [place]     Phone Number

 

   



                                         [facility]   

 

   



                                         [facility]   





* TSH (2018 11:23 AM CST)



Only the most recent of 2 results within the time period is included.





    



              Component     Value        Ref Range     Performed At     Pathologist



                                         Signature

 

    



                 TSH             0.57            0.57 - 3.74 uIU/mL     [facility] 



                                         1 













                                         Specimen

 





                                         Blood









   



                 Performing Organization     Address         [facility]     Phone Number

 

   



                                         [facility]   

 

   



                                         [facility]   





* FREE T4 (2018 11:23 AM CST)



Only the most recent of 2 results within the time period is included.





    



              Component     Value        Ref Range     Performed At     Pathologist



                                         Signature

 

    



                 Free T4         0.68            0.61 - 1.18 ng/dl     [facility] 



                                         1 













                                         Specimen

 





                                         Blood









   



                 Performing Organization     Address         [place] [place]     [place]

 

   



                                         [facility]   

 

   



                                         [facility]   





* HEPATITIS PANEL (2018 11:23 AM CST)



Only the most recent of 2 results within the time period is included.





    



              Component     Value        Ref Range     Performed At     Pathologist



                                         Signature

 

    



                 HCV IgG         Negative        NEG             [facility] 



                                         3 

 

    



                 HBsAg           Negative        NEG             [facility] 



                                         3 

 

    



                 HAV, IgM        Negative        NEG             [facility] 



                                         3 

 

    



                 HBcAb, IgM      Negative        NEG             [facility] 



                                         3 













                                         Specimen

 





                                         Blood









   



                 Performing Organization     Address         [place] [place]     [place]

 

   



                                         [facility]   

 

   



                                         [facility]   





* BASIC METABOLIC PANEL (2018 11:23 AM CST)



Only the most recent of 2 results within the time period is included.





    



              Component     Value        Ref Range     Performed At     Pathologist



                                         Signature

 

    



                 CO2             24              21 - 31 mmol/L     [facility] 



                                         1 

 

    



                 Chloride        99              98 - 107 mmol/L     [facility] 



                                         1 

 

    



                 Potassium       4.1             3.5 - 5.1 mmol/L     [facility] 



                                         1 

 

    



                 Sodium          137             136 - 145 mmol/L     [facility] 



                                         1 

 

    



                 Glucose         85              70 - 110 mg/dL     [facility] 



                                         1 

 

    



                 BUN             10              7 - 25 mg/dL     [facility] 



                                         1 

 

    



                 Creatinine      0.90            0.7 - 1.3 mg/dL     [facility] 



                                         1 

 

    



                     Anion Gap           14                  [facility] 



                                         1 

 

    



                 Calcium         9.4             8.6 - 10.3 mg/dL     [facility] 



                                         1 

 

    



                 GFR, Estimated     >60             mL/min/1.73 m2     [facility] 



                                         1 

 

    



                 eGFR If Africn     >60             mL/min/1.73 m2     [facility] 



                           Am                        1 













                                         Specimen

 





                                         Blood









   



                 Performing Organization     Address         [place] [place]     Phone Number

 

   



                                         [facility]   

 

   



                                         [facility]   





* OCCULT BLOOD ICT (2018  3:50 PM CDT)





    



              Component     Value        Ref Range     Performed At     Pathologist



                                         Signature

 

    



                 Occult Blood     Negative        NEG             [facility] 



                           [facility]                       LAB 2 













                                         Specimen

 





                                         Stool









   



                 Performing Organization     Address         [place] [place]     Phone Number

 

   



                                         [facility] 2   





* CBC/DIFF (2018 12:06 PM CDT)





    



              Component     Value        Ref Range     Performed At     Pathologist



                                         Signature

 

    



                 WBC             7.6             4.5 - 12.0 K/uL     [facility] 



                                         2 

 

    



                 RBC             5.07            4.60 - 6.20 M/uL     [facility] 



                                         2 

 

    



                 Hemoglobin      15.6            14.0 - 18.0 g/dL     [facility] 



                                         2 

 

    



                 Hematocrit      45.6            40.0 - 54.0 %     [facility] 



                                         2 

 

    



                 MCV             90              82 - 92 fL      [facility] 



                                         2 

 

    



                 MCH             30.8            27.0 - 31.0 pg     [facility] 



                                         2 

 

    



                 MCHC            34.2            32.0 - 36.0 g/dL     [facility] 



                                         2 

 

    



                 RDW             41.8            35.1 - 43.9 fL     [facility] 



                                         2 

 

    



                 Platelets       284             150 - 400 K/uL     [facility] 



                                         2 

 

    



                 Mean Platelet     11.0            9.4 - 12.4 fL     [facility] 



                           Volume                    2 

 

    



                     Percent NRBC        0.0                 [facility] 



                                         2 

 

    



                     Absolute NRBC       0.00                [facility] 



                                         2 

 

    



                 Neutrophils     69.1 (H)        34.0 - 67.9 %     [facility] 



                                         2 

 

    



                 Lymphs          16.8 (L)        21.8 - 50.0 %     [facility] 



                                         2 

 

    



                 Monocytes       11.9            5.3 - 12.0 %     [facility] 



                                         2 

 

    



                 Eos             1.4             0.8 - 5.0 %     [facility] 



                                         2 

 

    



                 Basos           0.5             0.2 - 1.2 %     [facility] 



                                         2 

 

    



                 Immature        0.3             0.0 - 0.5       [facility] 



                           Granulocytes              2 

 

    



                 Neutrophils     5.27            1.78 - 5.36 K/uL     [facility] 



                           (Absolute)                2 

 

    



                 Lymphs          1.28 (L)        1.32 - 3.57 K/uL     [facility] 



                           (Absolute)                2 

 

    



                 Monocytes(Absol     0.91 (H)        0.30 - 0.82 K/uL     [facility] 



                           Lime)                      2 

 

    



                 Eos (Absolute)     0.11            0.04 - 0.54 K/uL     [facility] 



                                         2 

 

    



                 Baso (Absolute)     0.04            0.01 - 0.08 K/uL     [facility] 



                                         2 

 

    



                 Immature Grans     0.02            0.00 - 0.03 K/uL     [facility] 



                           (Abs)                     2 













                                         Specimen

 





                                         Blood









   



                 Performing Organization     Address         City/State/Zipcode     Phone Number

 

   



                                         [facility]   

 

   



                                         [facility]   





* HSV 1 AND 2-SPECIFIC AB, IGG W/ REFLEX (2018 11:39 AM CDT)





    



              Component     Value        Ref Range     Performed At     Pathologist



                                         Signature

 

    



                     HSV 1 IGG           <0.91               [facility] 



                           Reference range: 0.00 to 0.90      [facility] 



                           Unit: index               [facility] 



                                         (note)   



                                            



                                            



                                         Negative<0   



                                         .91   



                                            



                                            



                                         Equivocal 0.91 - 1.09   



                                            



                                            



                                         Positive>1   



                                         .09   



                                         Note: Negative indicates no   



                                         antibodies detected to   



                                         HSV-1. Equivocal may suggest   



                                         early infection.If   



                                         clinically appropriate, retest   



                                         at later date. Positive   



                                         indicates antibodies detected   



                                         to HSV-1.   

 

    



                     HSV 2 IgG           6.24                [facility] 



                           Reference range: 0.00 to 0.90      [facility] 



                           Unit: index               [facility] 



                                         (note)   



                                            



                                            



                                         Negative<0   



                                         .91   



                                            



                                            



                                         Equivocal 0.91 - 1.09   



                                            



                                            



                                         Positive>1   



                                         .09   



                                         Note: Negative indicates no   



                                         antibodies detected to   



                                         HSV-2. Equivocal may suggest   



                                         early infection.If   



                                         clinically appropriate, retest   



                                         at later date. Positive   



                                         indicates antibodies detected   



                                         to HSV-2.   













                                         Specimen

 





                                         Blood Products (Lab Use



                                         Only) - BLOOD









   



                 Performing Organization     Address         City/State/Zipcode     Phone Number

 

   



                                         [facility]   

 

   



                 [facility]     [address] [place] [address] [phone]



                           [place]                   145  





* CHLAM/GC DNA AMPLI (2018 11:39 AM CDT)





    



              Component     Value        Ref Range     Performed At     Pathologist



                                         Signature

 

    



                 Chlamydia trach     Negative        NEG             [facility] 



                                         [facility] 

 

    



                 N gonorrhoeae     Negative        NEG             [facility] 



                           Comment:                  IMMUNOLOGY 



                                         This test utilizes [employer]   



                                         AptREPUCOM Combo 2 Assay for   



                                         target amplification of   



                                         rRNA for the qualitative   



                                         detection of Chlamydia   



                                         trachomatis and Neisseria   



                                         gonorrhea.   

 

    



                     Spec                Urine               [facility] 



                                         Description    













                                         Specimen

 





                                         Other (Specify in



                                         Comments) - Voided, urine









   



                 Performing Organization     Address         City[facility]     Phone Number

 

   



                                         [facility]   

 

   



                                         [facility]   

 

   



                                         [facility]   





* SYPHILIS SCREEN FOR INFECTION (2018 11:38 AM CDT)





    



              Component     Value        Ref Range     Performed At     Pathologist



                                         Signature

 

    



                     Treponemal Ab       Negative            [facility] 



                                         [facility] 

 

    



                     Final Report        Negative            [facility] 



                                         [facility] 













                                         Specimen

 











   



                 Performing Organization     Address         City/[facility]     Phone Number

 

   



                                         [facility]   

 

   



                                         [facility]   





after 2018



Insurance







                                        Type



            Payer      Benefit     Subscriber ID     Effective     Phone      Address 



                           Plan /                    Dates   



                                         Group     

 

                                         



            [facility]        xxxxxxxxx     2016-P     [phone]     [address] 



                 COMMUNITY      COMMUNITY       [id number]          [id number] 



                           [place] 



                                         [address] 









     



            Guarantor Name     Account     Relation to     Date of     Phone      Billing Address



                     Type                Patient             Birth  

 

     



            Renaldo Serna     Personal/F     Self       1955     [phone]     [address]



                     amily               (Home)              [place] [address]

 

     



            Renaldo Serna     Third      Self       1955     [phone]     [address]



                     Party               (Home)              [place] [address]



                           Liability                 [phone] 



                                         (Work)

## 2019-07-03 NOTE — XMS REPORT
Patient Summary Document

                             Created on: 2019



KHALIF NAVARRETEN

External Reference #: [id number]

: 1955

Sex: Male



Demographics







                          Address                   [address] 

[place]  [address]

 

                          Home Phone                [phone]

 

                          Preferred Language        Unknown

 

                          Marital Status            Unknown

 

                          Yarsani Affiliation     Unknown

 

                          Race                      Unknown

 

                          Ethnic Group              Unknown





Author







                          Author                    [facility]

 

                          [facility]

 

                          Address                   Unknown

 

                          Phone                     Unavailable







Care Team Providers







                    Care Team Member Name    Role                Phone

 

                          Unavailable               Unavailable







Problems

This patient has no known problems.



Allergies, Adverse Reactions, Alerts

This patient has no known allergies or adverse reactions.



Medications

This patient has no known medications.



Encounters







             Start Date/Time    End Date/Time    Encounter Type    Admission Type    Attending [facility] Facility       Care Department     Encounter ID

 

        2019 10:30:21    2019 10:30:21    Outpatient                    [facility]     [id number]

 

        2019 09:00:57    2019 09:00:57    Outpatient                    [facility]     [id number]

 

        2018 11:30:11    2018 11:30:11    Outpatient                    [facility]     [id number]

 

        2018 10:20:36    2018 10:20:36    Outpatient                    [facility]     [id number]

 

        2018 00:00:00    2018 00:00:00    Outpatient                    [facility]     [id number]

 

        2018 00:00:00    2018 00:00:00    Outpatient                    [facility]     [id number]

 

        2018 00:00:00    2018 00:00:00    Outpatient                    [facility]     [id number]

 

        2018 00:00:00    2018 00:00:00    Outpatient                    [facility]     [id number]

 

        2018-11-10 00:00:00    2018-11-10 00:00:00    Outpatient                    [facility]     [id number]

 

        2018 00:00:00    2018 00:00:00    Outpatient                    [facility]     [id number]

 

        2018-10-30 00:00:00    2018-10-30 00:00:00    Outpatient                    [facility]     [id number]

 

        2018 00:00:00    2018 00:00:00    Outpatient                    [facility]     [id number]

 

        2018 00:00:00    2018 00:00:00    Outpatient                    [facility]     [id number]

 

        2018 00:00:00    2018 00:00:00    Outpatient                    [facility]     [id number]

 

        2018 15:42:09    2018 15:42:09    Outpatient                    [facility]     [id number]

 

        2018 07:34:30    2018 07:34:30    Outpatient                    [facility]     [id number]

 

        2018 00:00:00    2018 00:00:00    Outpatient                    [facility]     [id number]

 

        2018 00:00:00    2018 00:00:00    Outpatient                    [facility]     [id number]

 

        2018 12:11:37    2018 12:11:37    Outpatient                    [facility]     [id number]

 

        2018 10:42:05    2018 10:42:05    Outpatient                    [facility]     [id number]

 

        2018 00:00:00    2018 00:00:00    Outpatient                    [facility]     [id number]

 

        2018 00:00:00    2018 00:00:00    Outpatient                    [facility]     [id number]

 

        2018 00:00:00    2018 00:00:00    Outpatient                    [facility]     [id number]

 

        2018-01-10 00:00:00    2018-01-10 00:00:00    Outpatient                    [facility]     [id number]

 

        2018 14:49:34    2018 14:49:34    Outpatient                    [facility]     [id number]

 

        2017 11:24:15    2017 11:24:15    Outpatient                    [facility]     [id number]

 

        2017 10:28:17    2017 10:28:17    Outpatient                    [facility]     [id number]

 

        2017 12:05:39    2017 12:05:39    Outpatient                    [facility]     [id number]

 

        2017 10:02:30    2017 10:02:30    Outpatient                    [facility]     [id number]

 

        2017 14:35:20    2017 14:35:20    Outpatient                    [facility]     [id number]

## 2019-07-03 NOTE — DIAGNOSTIC IMAGING REPORT
EXAM: CT Abdomen and Pelvis WITH contrast  

INDICATION:      ^abd pain s/p colonoscopy 

COMPARISON: None.

TECHNIQUE: Abdomen and pelvis were scanned utilizing a multidetector helical

scanner from the lung base to the pubic symphysis after administration of IV

contrast. Coronal and sagittal reformations were obtained. Routine protocol was

performed. Scan was performed when during portal venous phase.    

     IV CONTRAST: 100 mL of Isovue 370

     ORAL CONTRAST: Gastrografin

            

COMPLICATIONS: None



RADIATION DOSE:

     Total DLP: 344.57 mGy*cm

     Estimated effective dose: (DLP x 0.015 x size factor) mSv

     CTDIvol has been reviewed. It is below the limits set by the Radiation

Protocol Committee (RPC).

     Dose modulation, iterative reconstruction, and/or weight based adjustment

of the mA/kV was utilized to reduce the radiation dose to as low as reasonably

achievable. 



FINDINGS:



LINES and TUBES: None.



LOWER THORAX:  There is bibasilar atelectasis.



HEPATOBILIARY: The liver is diffuse hypodense compared to the spleen,

consistent with diffuse hepatic diffuse hepatic steatosis.  No focal hepatic

lesions. No biliary ductal dilation. 



GALLBLADDER: No radio-opaque stones or sludge.  No wall thickening.



SPLEEN: Mild splenomegaly measuring 13.5 cm in AP dimension. 



PANCREAS: No focal masses or ductal dilatation.  



ADRENALS: No adrenal nodules    



KIDNEYS/URETERS: Kidneys enhance symmetrically.  No hydronephrosis. No cystic

or solid mass lesions.  No stones.



GI TRACT: No abnormal distention, wall thickening, or evidence of bowel

obstruction.  The cecum is located in the right upper quadrant abdomen,

suggesting possible cecal bascule. There is mild inflammatory changes at the

cecum near the terminal ileum. The appendix is normal in caliber. However, the

inflammatory changes involving the base of the appendix and the cecum.



Trace free fluid in the right upper quadrant abdomen near the edge of the

liver.



PELVIC ORGANS/BLADDER: Unremarkable.



LYMPH NODES: No lymphadenopathy. Several prominent collin hepatis lymph nodes.



VESSELS: There is moderate atherosclerotic disease in the aorta and major

arterial branches. Circumaortic left renal vein.



PERITONEUM / RETROPERITONEUM: No free air or fluid.



BONES: Unremarkable.



SOFT TISSUES: 2.1 cm omental containing hernia within measuring 0.9 cm.        

   



IMPRESSION: 

1.  Cecum is located in the right upper quadrant abdomen, possibly cecal

bascule.

2.  Mild inflammatory changes around the cecum and at the base of the appendix.

This is less likely appendicitis. This may be related to recent colonoscopy. No

free air to suggest perforation.



Signed by: Dr. Zhang Ahuja M.D. on 7/3/2019 10:17 PM

## 2019-07-04 VITALS — DIASTOLIC BLOOD PRESSURE: 84 MMHG | SYSTOLIC BLOOD PRESSURE: 150 MMHG

## 2019-07-04 VITALS — DIASTOLIC BLOOD PRESSURE: 81 MMHG | SYSTOLIC BLOOD PRESSURE: 142 MMHG

## 2019-07-04 VITALS — SYSTOLIC BLOOD PRESSURE: 167 MMHG | DIASTOLIC BLOOD PRESSURE: 87 MMHG

## 2019-07-04 VITALS — SYSTOLIC BLOOD PRESSURE: 150 MMHG | DIASTOLIC BLOOD PRESSURE: 93 MMHG

## 2019-07-04 VITALS — SYSTOLIC BLOOD PRESSURE: 154 MMHG | DIASTOLIC BLOOD PRESSURE: 87 MMHG

## 2019-07-04 VITALS — SYSTOLIC BLOOD PRESSURE: 144 MMHG | DIASTOLIC BLOOD PRESSURE: 78 MMHG

## 2019-07-04 VITALS — DIASTOLIC BLOOD PRESSURE: 87 MMHG | SYSTOLIC BLOOD PRESSURE: 167 MMHG

## 2019-07-04 VITALS — DIASTOLIC BLOOD PRESSURE: 85 MMHG | SYSTOLIC BLOOD PRESSURE: 151 MMHG

## 2019-07-04 LAB
ALBUMIN SERPL-MCNC: 3.7 G/DL (ref 3.5–5)
ALBUMIN/GLOB SERPL: 1.4 {RATIO} (ref 0.8–2)
ALP SERPL-CCNC: 89 IU/L (ref 40–150)
ALT SERPL-CCNC: 49 IU/L (ref 0–55)
AMYLASE SERPL-CCNC: 39 U/L (ref 25–125)
ANION GAP SERPL CALC-SCNC: 13.5 MMOL/L (ref 8–16)
BASOPHILS # BLD AUTO: 0 10*3/UL (ref 0–0.1)
BASOPHILS NFR BLD AUTO: 0.2 % (ref 0–1)
BUN SERPL-MCNC: 6 MG/DL (ref 7–26)
BUN/CREAT SERPL: 7 (ref 6–25)
CALCIUM SERPL-MCNC: 9.1 MG/DL (ref 8.4–10.2)
CHLORIDE SERPL-SCNC: 97 MMOL/L (ref 98–107)
CO2 SERPL-SCNC: 26 MMOL/L (ref 22–29)
DEPRECATED NEUTROPHILS # BLD AUTO: 11.1 10*3/UL (ref 2.1–6.9)
EGFRCR SERPLBLD CKD-EPI 2021: > 60 ML/MIN (ref 60–?)
EOSINOPHIL # BLD AUTO: 0 10*3/UL (ref 0–0.4)
EOSINOPHIL NFR BLD AUTO: 0.2 % (ref 0–6)
ERYTHROCYTE [DISTWIDTH] IN CORD BLOOD: 12.5 % (ref 11.7–14.4)
GLOBULIN PLAS-MCNC: 2.6 G/DL (ref 2.3–3.5)
GLUCOSE SERPLBLD-MCNC: 112 MG/DL (ref 74–118)
HCT VFR BLD AUTO: 36.1 % (ref 38.2–49.6)
HGB BLD-MCNC: 12.4 G/DL (ref 14–18)
LIPASE SERPL-CCNC: 15 U/L (ref 8–78)
LYMPHOCYTES # BLD: 0.6 10*3/UL (ref 1–3.2)
LYMPHOCYTES NFR BLD AUTO: 4.9 % (ref 18–39.1)
MCH RBC QN AUTO: 31.6 PG (ref 28–32)
MCHC RBC AUTO-ENTMCNC: 34.3 G/DL (ref 31–35)
MCV RBC AUTO: 91.9 FL (ref 81–99)
MONOCYTES # BLD AUTO: 0.9 10*3/UL (ref 0.2–0.8)
MONOCYTES NFR BLD AUTO: 7 % (ref 4.4–11.3)
NEUTS SEG NFR BLD AUTO: 87.5 % (ref 38.7–80)
PLATELET # BLD AUTO: 197 X10E3/UL (ref 140–360)
POTASSIUM SERPL-SCNC: 3.5 MMOL/L (ref 3.5–5.1)
RBC # BLD AUTO: 3.93 X10E6/UL (ref 4.3–5.7)
SODIUM SERPL-SCNC: 133 MMOL/L (ref 136–145)

## 2019-07-04 RX ADMIN — METRONIDAZOLE SCH MLS/HR: 500 INJECTION, SOLUTION INTRAVENOUS at 14:00

## 2019-07-04 RX ADMIN — Medication PRN MG: at 05:28

## 2019-07-04 RX ADMIN — Medication PRN MG: at 19:07

## 2019-07-04 RX ADMIN — SODIUM CHLORIDE SCH MLS/HR: 9 INJECTION, SOLUTION INTRAVENOUS at 07:23

## 2019-07-04 RX ADMIN — SODIUM CHLORIDE SCH MLS/HR: 9 INJECTION, SOLUTION INTRAVENOUS at 21:30

## 2019-07-04 RX ADMIN — CIPROFLOXACIN SCH MLS/HR: 2 INJECTION, SOLUTION INTRAVENOUS at 09:00

## 2019-07-04 RX ADMIN — SODIUM CHLORIDE PRN MG: 900 INJECTION INTRAVENOUS at 23:40

## 2019-07-04 RX ADMIN — Medication PRN MG: at 01:01

## 2019-07-04 RX ADMIN — SODIUM CHLORIDE PRN MG: 900 INJECTION INTRAVENOUS at 19:07

## 2019-07-04 RX ADMIN — Medication PRN MG: at 23:40

## 2019-07-04 RX ADMIN — METRONIDAZOLE SCH MLS/HR: 500 INJECTION, SOLUTION INTRAVENOUS at 05:28

## 2019-07-04 RX ADMIN — CIPROFLOXACIN SCH MLS/HR: 2 INJECTION, SOLUTION INTRAVENOUS at 22:30

## 2019-07-04 RX ADMIN — SODIUM CHLORIDE SCH MLS/HR: 9 INJECTION, SOLUTION INTRAVENOUS at 15:23

## 2019-07-04 RX ADMIN — Medication PRN MG: at 11:19

## 2019-07-04 RX ADMIN — METRONIDAZOLE SCH MLS/HR: 500 INJECTION, SOLUTION INTRAVENOUS at 00:00

## 2019-07-04 RX ADMIN — METRONIDAZOLE SCH MLS/HR: 500 INJECTION, SOLUTION INTRAVENOUS at 23:30

## 2019-07-04 RX ADMIN — CIPROFLOXACIN SCH MLS/HR: 2 INJECTION, SOLUTION INTRAVENOUS at 01:32

## 2019-07-04 RX ADMIN — SODIUM CHLORIDE PRN MG: 900 INJECTION INTRAVENOUS at 11:20

## 2019-07-04 RX ADMIN — SODIUM CHLORIDE SCH MLS/HR: 9 INJECTION, SOLUTION INTRAVENOUS at 01:32

## 2019-07-04 RX ADMIN — SODIUM CHLORIDE PRN MG: 900 INJECTION INTRAVENOUS at 23:30

## 2019-07-04 NOTE — CONSULTATION
DATE OF CONSULTATION:  07/04/2019  

 

REASON FOR CONSULTATION:  Abdominal pain following upper and lower GI endoscopy.

 

HISTORY OF PRESENT ILLNESS:  I was asked by Dr. Samano to evaluate this 
patient, who

yesterday underwent both upper and lower GI endoscopies at [place] at an 
[facility]

medical center by Dr. Arvizu.  Following the two endoscopies during which he had

biopsies of the stomach and also the cecum, the patient developed generalized 
diffuse

abdominal pain and then he was instructed by his endoscopist to come to the 
[facility].  The patient came to the emergency room, where he had a CT scan 
that revealed 

no acute process,a cecum in the right upper quadrant ,possible cecal bascule 
There were some changes in the 

cecum which were felt secondary to the biopsy,but specifically there was no free
air.It was felt this was 

most likely diagnosis and less likely appendicitis.  His white count was 15,000.
 The patient was given

antibiotics, made n.p.o. and currently he is feeling much much better.  The 
reason for

the colonoscopy was the fact that the patient has history of polyps and colon 
cancer.

He has been having right lower quadrant pain for over a year.  Currently, his 
white

count is down to 12 and he is afebrile with stable vital signs. 

 

PAST MEDICAL HISTORY:  The patient is disabled due to back injury and takes 
regurlarly pain medicines for it. 

He has not had any  abdominal surgeries.  He has history of high blood pressure 
.

 

PHYSICAL EXAMINATION:

GENERAL:  Reveals a 63-year-old male in no acute distress. 

VITAL SIGNS:  He is afebrile with stable vital signs. 

HEAD, EYES, EARS, NOSE, AND THROAT:  Reveals no acute process. 

LUNGS:  Clear. 

HEART:  Reveals a sinus rhythm. 

ABDOMEN:  Mildly distended but soft.  There are no peritoneal signs.  The 
patient has an

umbilical hernia with thinned out skin. 

EXTREMITIES:  Reveal no clubbing, cyanosis, or edema.  The patient walks with a 
cane due

to chronic back pain for which he takes hydrocodone on a regular basis. 

ASSESSMENT:  

1. Abdominal pain following upper and lower GI endoscopies.  This developed 
suddenly

after the colonoscopy and the EGD.  At this point, on CT scan, there is no 
evidence of

perforation.  Clinically, the patient is doing much better.  The assessment is 
likely

pain from the gaseous distention of the intestine due to simultaneous 
endoscopies.  I do

not think that at this point there is any acute surgical process. 

2. The patient has a chronic umbilical hernia with thinned out skin which is 
kind of

dusky when he stands up, but it reduces.  The recommendation at this point is 
there is

no need for surgical intervention.  I have given orders for the patient to be 
given

clear liquids and dulcolax suppositories. He was advised elctive repair of tje 
umbilical 

 

Thank you very much for the courtesy of this consultation.

 

 

 

 

______________________________

MD VASILIY Garza/ERICKA

D:  07/04/2019 15:25:07

T:  07/04/2019 15:46:00

Job #:  [id number]

 

MTDDANA

## 2019-07-04 NOTE — NUR
Assessed patient noted little SOB, Spo2 maintained 92% with RA. Assisted to connected 
2lierts O2 via nasal canula and stop fluids at this time. V/S WNL. Will continue to monitor.

## 2019-07-04 NOTE — CONSULTATION
DATE OF CONSULTATION:  07/04/2019  

 

HISTORY OF PRESENT ILLNESS:  Mr. Serna is a patient known to me from office visit, 63

years old, who presented with complaint of abdominal pain in the right lower quadrant,

been for quite a bit of time, episodic, no triggering factor, describe it as burning,

when it occurs it lasts a couple of days before it resolves, does not radiate, moderate

in intensity, no relieving factor in particular, associated with heartburn.  The patient

denied constipation, change in bowel habits.  Denied any nausea, vomiting, or bleeding.

At his presentation, upper and lower endoscopy were scheduled and yesterday, the patient

had his upper endoscopy, which revealed a large gastric body polyp, it was resected

after injecting the base with epinephrine and a clip was placed at the site.  On his

colonoscopy, he was found to have a small polyp in his cecum and it was removed by hot

biopsy.  Subsequently, after the patient went home, he called us because he started

having excruciating abdominal pain and was advised him to come back to the emergency

room.  In the emergency room, he was evaluated and CT scan of the abdomen with contrast

was done, but no acute finding was found.  Due to his severe abdominal pain and white

cell count of 15,000, he was admitted for observation and he was placed on n.p.o.

status.  This morning, when I talked to him, he still has abdominal discomfort mainly in

the epigastrium and around his umbilicus, I assume related to the polyp removed from his

stomach.  He is afebrile, his white cell count has dropped to 12,000 and he is still

n.p.o. 

 

REVIEW OF SYSTEMS:

Today is unremarkable.

 

PAST MEDICAL HISTORY:  Hypertension and arthritis and chronic obstructive pulmonary

airway disease. 

 

PAST SURGICAL HISTORY:  No previous heart surgery.

 

FAMILY HISTORY:  Strong history of colon cancer.

 

SOCIAL HISTORY:  He is retired, has no children, is single.  He drinks alcohol socially.

 He does not smoke. 

 

ALLERGIES:  NIL.

 

CURRENT MEDICATIONS:  While he is in the hospital, he is on Flagyl, Cipro, and Zofran.

 

PHYSICAL EXAMINATION:

GENERAL:  Awake, alert, oriented, hemodynamically stable. 

VITAL SIGNS:  Temperature 97, pulse 85, blood pressure 150/84. 

NECK:  Supple. 

LUNGS:  Clear to auscultation. 

HEART:  Irregularly irregular rhythm. 

ABDOMEN:  Soft, but diffusely tender.  Bowel sounds present.  No acute sign except for

some guarding around the umbilicus and epigastric area. 

CENTRAL NERVOUS SYSTEM:  Motor function grossly intact.

LAB TESTS:  BUN 9, creatinine 1.12, sodium 131, potassium 3.4.  White cell count today

is 12 from 15 yesterday, hemoglobin 12, hematocrit 36 presuming to the hydration.  His

total bilirubin is 2, AST 84, ALT 68, and alkaline phosphatase normal. 

 

IMPRESSION:  Pain following endoscopy procedure most likely related to the large polyp

resected from the stomach and clip placed in addition to epinephrine injection.  CT scan

showed no acute finding.  We will continue his observation with n.p.o. status.  I am

placing him on antiacid medication.  As far as the abnormal liver function, this is

noted in the office when he 

saw us as an outpatient and currently his workup is in progress as an outpatient for the

abnormality on his liver enzymes. 

 

 

 

 

______________________________

Rachael Arvizu MD RD/ERICKA

D:  07/04/2019 07:47:57

T:  07/04/2019 13:15:15

Job #:  [id number]

## 2019-07-04 NOTE — NUR
RECEIVED PATIENT IN STABLE CONDITION, NO DISTRESS NOTED. PATIENT BED IN LOWEST POSITION AND 
LOCKED, CALL LIGHT WITHIN EASY REACH WILL CONTINUE TO MONITOR.

## 2019-07-04 NOTE — NUR
PT AWAKE RESP EVEN AND UNLABORED AT THIS TIME NO DISTRESS NOTED, PT ABLE TO MAKE NEEDS 
KNOWN, NO C/O PAIN WHEN ASKED, CALL LIGHT IN REACH.

## 2019-07-04 NOTE — NUR
Report received from FROYLAN clements. Patient admitted in unit @0044 by stretcher. Patient received 
alert/oriented x3. Patient complained severe pain on his abdomen and back. Medicated as 
ordered for pain. Head to toe assessment completed. No skin breakdown noted. Bed in lower 
position,locked. Call bell within reach. Patient instructed to call for help as 
needed,verbalized and understand. Will continue to monitor.

## 2019-07-04 NOTE — HISTORY AND PHYSICAL
HISTORY OF PRESENT ILLNESS:  The patient states he was sent to the emergency room by his

gastroenterologist because of abdominal pain.  See also ER notes.  He was hospitalized

today because of abdominal pain and elevated white count.  He denies nausea or vomiting,

GI bleed or diarrhea.  History is significant for EGD and colonoscopy on , see

operative notes when available.  A large gastric body polyp was resected.  A small polyp

in the cecum was removed.  ER performed abdominal CT, see report.  Impression, cecum is

located in the right upper quadrant.  Mild inflammatory changes around the cecum and at

the base of the appendix.  "This is less likely appendicitis."  No free air to suggest

perforation. 

 

REVIEW OF SYSTEMS:

CONSTITUTIONAL:  The patient denies headache or visual change.  Denies chest pain or

shortness of breath. 

GI:  As above.  See also note per GI.  See ER notes also. 

CARDIORESPIRATORY:  Per the patient negative. 

GENITOURINARY:  ED. 

NEUROMUSCULAR:  Chronic back pain.

 

PAST SURGICAL HISTORY:  Denies prior surgery.

 

PAST MEDICAL HISTORY:  Included hypertension, degenerative joint disease, COPD.  He has

been seeing a hepatologist because of prior elevation of hepatic enzymes.  He has been a

regular user of ethanol. 

 

ALLERGIES:  DENIES ALLERGIES.

 

FAMILY HISTORY:  Positive for colon cancer.

 

SOCIAL HISTORY:  Denies tobacco use.

 

PHYSICAL EXAMINATION:

VITAL SIGNS:  Temperature is 99, pulse 82 and regular, respiratory rate 19, /80. 

HEENT:  Pupils are round and reactive.  EOMs full.  No icterus or pallor.  Throat clear. 

NECK:  Supple.  Carotids palpable.  No palpable goiter. 

PULMONARY:  Auscultation is clear. 

CARDIAC:  Sounds S1, S2 soft. 

ABDOMEN:  Tender.  Bowel sounds reduced. 

EXTREMITIES:  Peripheral pulses 1+.  No edema, clubbing, or cyanosis.  Leg raise

negative.  DTRs reduced.  Strength in legs chronically reduced.  The patient has used a

cane for extended period of time because of discomfort in his back.  He has required

chronic hydrocodone to control his back pain. 

MEDICATIONS:  EMR reviewed.

 

LABORATORY DATA:  White count 15.24, hemoglobin 14.2, platelet count normal.  Amylase

and lipase are normal.  Pyuria.  Nitrite negative and UA.  Leukocyte esterase negative

on UA. 

 

IMPRESSION:  Post endoscopy abdominal discomfort, moderately severe on arrival.  Now

controlled with intravenous analgesics.  Inflammation about the cecum on CT, see report. 

 

Chronic medical illnesses as mentioned above, includin. Chronic pain and chronic requirements for hydrocodone prior to admission.

2. Chronic obstructive pulmonary disease.

3. Primary hypertension.

4. History of elevated hepatic enzymes.  The patient has been seeing a hepatologist

intermittently. 

 

PLAN:  See also initial orders per ER and followup orders per the patient's

gastroenterologist from this morning.  To follow up hematocrits and KUB.  Continue

analgesics as needed.  See also initial and followup orders.  The patient is on proton

pump inhibitors per GI.  Further treatment pending course. 

 

 

 

 

______________________________

MD ALBERTO Sloan/MODL

D:  2019 14:35:55

T:  2019 15:06:07

Job #:  [id number]

## 2019-07-05 VITALS — DIASTOLIC BLOOD PRESSURE: 95 MMHG | SYSTOLIC BLOOD PRESSURE: 116 MMHG

## 2019-07-05 VITALS — DIASTOLIC BLOOD PRESSURE: 70 MMHG | SYSTOLIC BLOOD PRESSURE: 147 MMHG

## 2019-07-05 VITALS — SYSTOLIC BLOOD PRESSURE: 119 MMHG | DIASTOLIC BLOOD PRESSURE: 75 MMHG

## 2019-07-05 LAB
ALBUMIN SERPL-MCNC: 3.3 G/DL (ref 3.5–5)
ALBUMIN/GLOB SERPL: 1.2 {RATIO} (ref 0.8–2)
ALP SERPL-CCNC: 77 IU/L (ref 40–150)
ALT SERPL-CCNC: 36 IU/L (ref 0–55)
ANION GAP SERPL CALC-SCNC: 12.4 MMOL/L (ref 8–16)
BASOPHILS # BLD AUTO: 0 10*3/UL (ref 0–0.1)
BASOPHILS NFR BLD AUTO: 0.2 % (ref 0–1)
BUN SERPL-MCNC: 6 MG/DL (ref 7–26)
BUN/CREAT SERPL: 7 (ref 6–25)
CALCIUM SERPL-MCNC: 8.9 MG/DL (ref 8.4–10.2)
CHLORIDE SERPL-SCNC: 100 MMOL/L (ref 98–107)
CO2 SERPL-SCNC: 26 MMOL/L (ref 22–29)
DEPRECATED NEUTROPHILS # BLD AUTO: 7.2 10*3/UL (ref 2.1–6.9)
EGFRCR SERPLBLD CKD-EPI 2021: > 60 ML/MIN (ref 60–?)
EOSINOPHIL # BLD AUTO: 0.2 10*3/UL (ref 0–0.4)
EOSINOPHIL NFR BLD AUTO: 1.6 % (ref 0–6)
ERYTHROCYTE [DISTWIDTH] IN CORD BLOOD: 12.4 % (ref 11.7–14.4)
GLOBULIN PLAS-MCNC: 2.7 G/DL (ref 2.3–3.5)
GLUCOSE SERPLBLD-MCNC: 105 MG/DL (ref 74–118)
HCT VFR BLD AUTO: 34.7 % (ref 38.2–49.6)
HGB BLD-MCNC: 11.6 G/DL (ref 14–18)
LYMPHOCYTES # BLD: 1 10*3/UL (ref 1–3.2)
LYMPHOCYTES NFR BLD AUTO: 10.8 % (ref 18–39.1)
MCH RBC QN AUTO: 31.5 PG (ref 28–32)
MCHC RBC AUTO-ENTMCNC: 33.4 G/DL (ref 31–35)
MCV RBC AUTO: 94.3 FL (ref 81–99)
MONOCYTES # BLD AUTO: 0.8 10*3/UL (ref 0.2–0.8)
MONOCYTES NFR BLD AUTO: 8.6 % (ref 4.4–11.3)
NEUTS SEG NFR BLD AUTO: 78.6 % (ref 38.7–80)
PLATELET # BLD AUTO: 198 X10E3/UL (ref 140–360)
POTASSIUM SERPL-SCNC: 3.4 MMOL/L (ref 3.5–5.1)
RBC # BLD AUTO: 3.68 X10E6/UL (ref 4.3–5.7)
SODIUM SERPL-SCNC: 135 MMOL/L (ref 136–145)

## 2019-07-05 RX ADMIN — SODIUM CHLORIDE PRN MG: 900 INJECTION INTRAVENOUS at 00:15

## 2019-07-05 RX ADMIN — CIPROFLOXACIN SCH MLS/HR: 2 INJECTION, SOLUTION INTRAVENOUS at 09:00

## 2019-07-05 RX ADMIN — METRONIDAZOLE SCH MLS/HR: 500 INJECTION, SOLUTION INTRAVENOUS at 05:55

## 2019-07-05 RX ADMIN — Medication PRN MG: at 05:04

## 2019-07-05 RX ADMIN — SODIUM CHLORIDE SCH MLS/HR: 9 INJECTION, SOLUTION INTRAVENOUS at 07:23

## 2019-07-05 RX ADMIN — SODIUM CHLORIDE PRN MG: 900 INJECTION INTRAVENOUS at 05:04

## 2019-07-05 RX ADMIN — Medication PRN MG: at 00:15

## 2019-07-05 NOTE — DISCHARGE SUMMARY
The patient was hospitalized through the emergency room.  See also electronic medical

record, consultations, imaging and laboratory reports per EMR.  The patient required

hospitalization for pain control for severe abdominal pain.  He had undergone outpatient

EGD with gastric polyp removal.  Outpatient colonoscopy with cecal polyp removal.  See

also consultations as mentioned.  The patient was seen while here by his

gastroenterologist, who had performed the endoscopies required on the day prior to

hospitalization.  The patient was also seen by the General Surgery consultant while

here. 

 

History included chronic back pain and primary hypertension as well as COPD and history

of elevated liver function studies previously.  The patient is followed intermittently

by a hepatologist also. 

 

Database was ordered to be obtained and monitored including ER CT.  The patient required

intravenous morphine 4 mg q.4 hours p.r.n. to control his pain.  Broad-spectrum

intravenous antibiotics were ordered in emergency room.  IV fluids were continued

transiently when the patient was n.p.o.  Clear liquids were authorized on the morning of

July 5th and tolerated according to the patient's nurse.  Vital signs remained normal. 

 

On admission, white count on July 3rd was 15,240.  I was called regarding the patient

being admitted on the morning of July 4th early.  July 4th, white count was 12,540.  On

July 5th, white count was 9220.  Hemoglobin fell on July 3rd is 14.2, July 5th is 11.6.

No bleeding seen.  The platelet counts remain normal.  The patient had a mild left shift

on admission.  White count initially improved here.  ProTime 13.2 seconds with INR 0.95.

 PTT 27.  Urinalysis, 6 to 10 white cells per high-power field.  No dysuria or

frequency.  Chemistries, monitored serially with elevated liver function studies, which

serially improved while here.  Total bilirubin 2.1 on July 3rd, bilirubin 1.8 on July 5th.  AST 84, July 3rd, AST 35 on July 5th.  ALT 68, on July 3rd, 36 on July 5th.

Amylase and lipase were normal.  Potassium 3.4 on admission, 3.5 on July 4th, 3.4 on

July 5th.  Glucose 123 on July 3rd and 105 on July 5th.  His alkaline phosphatase is

normal.  Albumin and globulin are normal.  ER CT abdomen and pelvis, see report.  Liver

diffuse hypodensity compared to the spleen consistent with diffuse hepatic steatosis.

The patient was counseled to avoid toxins previously and now.  Mild splenomegaly.  Cecum

located right upper quadrant.  Mild inflammatory changes at cecum near the terminal

ileum.  Appendix is normal in caliber.  Inflammatory changes involve the base of the

appendix and the cecum.  Trace free fluid.  Right upper quadrant abdomen at the edge of

the liver.  Atherosclerosis.  2.1 cm omental containing hernia were then measuring 0.9

cm.  To exam, the patient has an umbilical hernia.  Abdominal x-ray on July 5th at 9

a.m., markedly dilated cecum, abnormally located in the mid epigastric region, unchanged

compared to  film from CT abdomen and pelvis.  Gas within the distal colon noted.

No pneumoperitoneum. 

 

The patient advised his nurse in the morning of his final hospital day that he was

leaving and he did leave against medical advice. 

 

FINAL IMPRESSION:  Post endoscopy abdominal pain with inflammation about the cecum.

Fluid about the gastric area.  Post biopsies.  The patient stated his symptoms resolved

on his final hospital day. 

 

Chronic medical illnesses include:  Chronic liver disease as above.  Chronic low back

pain with degenerative joint disease.  He has chronic pain syndrome.  Chronic

obstructive pulmonary disease.  Primary hypertension. 

 

 

 

 

______________________________

MD ALBERTO Sloan/MODL

D:  07/05/2019 16:56:08

T:  07/05/2019 20:15:11

Job #:  [id number]

## 2019-07-05 NOTE — NUR
Patient left against medical advice notified house supervisor, and charge nurse. 
Intervention by both charge nurse and house supervisor.  Patient adamantly left against 
medical advice.

## 2019-07-05 NOTE — NUR
Placed call to Dr. Samano, provided lab results, made him aware patient is wanting to go 
home, received orders for abdominal 2 view x-ray and to discharge patient. Per Dr. Samano 
if patient does not want to do abdominal x-ray go ahead and discharged him.

## 2019-07-05 NOTE — DIAGNOSTIC IMAGING REPORT
Exam: Abdominal film 



Clinical History: Abdominal pain



Comparison: CT abdomen and pelvis 7/3/2019



DISCUSSION:



Markedly dilated cecum, abnormally located in the mid epigastric region.

Unchanged compared to  tomogram from CT abdomen and pelvis. Gas within the

distal colon is also noted. No pneumoperitoneum.



No abnormal mass effect or calcification. Enteric contrast material opacifies

several loops of small bowel in the low abdomen and pelvis.



Regional skeletal structures are intact.



IMPRESSION:



Findings are again suggestive of cecal bascule or volvulus in the appropriate

clinical setting. No pneumoperitoneum.









Signed by: Dr. Channing Stringer M.D. on 7/5/2019 9:22 AM

## 2019-07-05 NOTE — NUR
Received orders from Dr. Samano to cancel discharge due to  abdominal, x-ray results. Made 
Dr. Samano aware that I had already finalized discharge but had not given it to patient, 
received orders to reverse, it. Informed, Dr. Samano patient was already dressed and was 
ready to leave, Per. Dr. Samano inform patient it will be against medical advise.